# Patient Record
Sex: MALE | Race: BLACK OR AFRICAN AMERICAN | Employment: UNEMPLOYED | URBAN - METROPOLITAN AREA
[De-identification: names, ages, dates, MRNs, and addresses within clinical notes are randomized per-mention and may not be internally consistent; named-entity substitution may affect disease eponyms.]

---

## 2018-05-10 ENCOUNTER — HOSPITAL ENCOUNTER (INPATIENT)
Age: 43
LOS: 6 days | Discharge: HOME | DRG: 750 | End: 2018-05-16
Attending: PSYCHIATRY & NEUROLOGY | Admitting: PSYCHIATRY & NEUROLOGY
Payer: MEDICAID

## 2018-05-10 PROBLEM — F20.9 SCHIZOPHRENIA (HCC): Status: ACTIVE | Noted: 2018-05-10

## 2018-05-10 PROCEDURE — 65220000003 HC RM SEMIPRIVATE PSYCH

## 2018-05-10 PROCEDURE — 74011250637 HC RX REV CODE- 250/637: Performed by: PSYCHIATRY & NEUROLOGY

## 2018-05-10 RX ORDER — ACETAMINOPHEN 325 MG/1
650 TABLET ORAL
Status: DISCONTINUED | OUTPATIENT
Start: 2018-05-10 | End: 2018-05-16 | Stop reason: HOSPADM

## 2018-05-10 RX ORDER — IBUPROFEN 400 MG/1
400 TABLET ORAL
Status: DISCONTINUED | OUTPATIENT
Start: 2018-05-10 | End: 2018-05-16 | Stop reason: HOSPADM

## 2018-05-10 RX ORDER — ZOLPIDEM TARTRATE 10 MG/1
10 TABLET ORAL
Status: DISCONTINUED | OUTPATIENT
Start: 2018-05-10 | End: 2018-05-16 | Stop reason: HOSPADM

## 2018-05-10 RX ORDER — OLANZAPINE 5 MG/1
5 TABLET ORAL
Status: DISCONTINUED | OUTPATIENT
Start: 2018-05-10 | End: 2018-05-16 | Stop reason: HOSPADM

## 2018-05-10 RX ORDER — LORAZEPAM 2 MG/ML
2 INJECTION INTRAMUSCULAR
Status: DISCONTINUED | OUTPATIENT
Start: 2018-05-10 | End: 2018-05-16 | Stop reason: HOSPADM

## 2018-05-10 RX ORDER — BENZTROPINE MESYLATE 1 MG/ML
2 INJECTION INTRAMUSCULAR; INTRAVENOUS
Status: DISCONTINUED | OUTPATIENT
Start: 2018-05-10 | End: 2018-05-16 | Stop reason: HOSPADM

## 2018-05-10 RX ORDER — LORAZEPAM 1 MG/1
1 TABLET ORAL
Status: DISCONTINUED | OUTPATIENT
Start: 2018-05-10 | End: 2018-05-16 | Stop reason: HOSPADM

## 2018-05-10 RX ORDER — BENZTROPINE MESYLATE 2 MG/1
2 TABLET ORAL
Status: DISCONTINUED | OUTPATIENT
Start: 2018-05-10 | End: 2018-05-16 | Stop reason: HOSPADM

## 2018-05-10 RX ORDER — ADHESIVE BANDAGE
30 BANDAGE TOPICAL DAILY PRN
Status: DISCONTINUED | OUTPATIENT
Start: 2018-05-10 | End: 2018-05-16 | Stop reason: HOSPADM

## 2018-05-10 RX ORDER — IBUPROFEN 200 MG
1 TABLET ORAL
Status: DISCONTINUED | OUTPATIENT
Start: 2018-05-10 | End: 2018-05-16 | Stop reason: HOSPADM

## 2018-05-10 RX ADMIN — ACETAMINOPHEN 650 MG: 325 TABLET, FILM COATED ORAL at 18:39

## 2018-05-10 NOTE — IP AVS SNAPSHOT
[address] 
[phone] Patient: Kezia Velázquez MRN: [id number] :1975 A check holden indicates which time of day the medication should be taken. My Medications START taking these medications Instructions Each Dose to Equal  
 Morning Noon Evening Bedtime  
 divalproex  mg ER tablet Commonly known as:  DEPAKOTE ER Your last dose was: Last night Your next dose is:  tonight Take 5 Tabs by mouth nightly. Indications: BIPOLAR DISORDER IN REMISSION  
 1250 mg  
    
   
   
   
  
  
 haloperidol 10 mg tablet Commonly known as:  HALDOL Your last dose was: This morning Your next dose is: This afternoon/evening Take 1 Tab by mouth three (3) times daily. Indications: Schizophrenia 10 mg  
    
  
   
   
  
   
  
  
 hydrOXYzine HCl 50 mg tablet Commonly known as:  ATARAX Your last dose was:  2018 Notes to Patient:  Takes as needed for anxiety Take 1 Tab by mouth three (3) times daily as needed for Itching for up to 10 days. Indications: anxiety 50 mg Where to Get Your Medications Information on where to get these meds will be given to you by the nurse or doctor. ! Ask your nurse or doctor about these medications  
  divalproex  mg ER tablet  
 haloperidol 10 mg tablet  
 hydrOXYzine HCl 50 mg tablet

## 2018-05-10 NOTE — IP AVS SNAPSHOT
2700 [facility] [address] [place] [address] 
[phone] Patient: Teresita Bradford MRN: [id number] :1975 About your hospitalization You were admitted on:  May 10, 2018 You last received care in the:  [facility] You were discharged on:  May 16, 2018 Why you were hospitalized Your primary diagnosis was:  Schizophrenia (Hcc) Follow-up Information Follow up With Details Comments Contact Info [address]  On 2018 walk in for treatment  [address] [facility] [id number] [phone] [facility]  On 2018 Marisol Gillespie will pick you up at your mothers home  39[address] [facility], [address] Hours: Open · Closes 5PM 
Phone: [phone] 
fax [phone] Discharge Orders None A check holden indicates which time of day the medication should be taken. My Medications START taking these medications Instructions Each Dose to Equal  
 Morning Noon Evening Bedtime  
 divalproex  mg ER tablet Commonly known as:  DEPAKOTE ER Your last dose was: Last night Your next dose is:  tonight Take 5 Tabs by mouth nightly. Indications: BIPOLAR DISORDER IN REMISSION  
 1250 mg  
    
   
   
   
  
  
 haloperidol 10 mg tablet Commonly known as:  HALDOL Your last dose was: This morning Your next dose is: This afternoon/evening Take 1 Tab by mouth three (3) times daily. Indications: Schizophrenia 10 mg  
    
  
   
   
  
   
  
  
 hydrOXYzine HCl 50 mg tablet Commonly known as:  ATARAX Your last dose was:  2018 Notes to Patient:  Takes as needed for anxiety Take 1 Tab by mouth three (3) times daily as needed for Itching for up to 10 days. Indications: anxiety 50 mg Where to Get Your Medications Information on where to get these meds will be given to you by the nurse or doctor. ! Ask your nurse or doctor about these medications  
  divalproex  mg ER tablet  
 haloperidol 10 mg tablet  
 hydrOXYzine HCl 50 mg tablet Discharge Instructions DISCHARGE SUMMARY 
 
NAME:Calvin Jean : 1975 MRN: [id number] The patient Nicole Zapien exhibits the ability to control behavior in a less restrictive environment. Patient's level of functioning is improving. No assaultive/destructive behavior has been observed for the past 24 hours. No suicidal/homicidal threat or behavior has been observed for the past 24 hours. There is no evidence of serious medication side effects. Patient has not been in physical or protective restraints for at least the past 24 hours. If weapons involved, how are they secured? No weapons involved Is patient aware of and in agreement with discharge plan? Patient is aware of discharge and is in agreement Arrangements for medication:  Prescriptions called into [facility] . Referral for substance abuse treatment ? Yes - referral to the [facility] Referral for smoking cessation needed ? Yes, refused Copy of discharge instructions to  provider?:  Yes, fax to [address] [place] [address] Drive and the [facility] Arrangements for transportation home:  Taxi Keep all follow up appointments as scheduled, continue to take prescribed medications per physician instructions. Mental health crisis number:  447 or your local mental health crisis line number at [phone] [facility] DISCHARGE SUMMARY from Nurse PATIENT INSTRUCTIONS: 
 
What to do at Home: 
Recommended activity: Activity as tolerated, If you experience any of the following symptoms , thoughts of hurting self or others, hearing or seeing things that ar not there, please follow up with Mental Health Crisis [phone]. *  Please give a list of your current medications to your Primary Care Provider. *  Please update this list whenever your medications are discontinued, doses are 
    changed, or new medications (including over-the-counter products) are added. *  Please carry medication information at all times in case of emergency situations. These are general instructions for a healthy lifestyle: No smoking/ No tobacco products/ Avoid exposure to second hand smoke Surgeon General's Warning:  Quitting smoking now greatly reduces serious risk to your health. Obesity, smoking, and sedentary lifestyle greatly increases your risk for illness A healthy diet, regular physical exercise & weight monitoring are important for maintaining a healthy lifestyle You may be retaining fluid if you have a history of heart failure or if you experience any of the following symptoms:  Weight gain of 3 pounds or more overnight or 5 pounds in a week, increased swelling in our hands or feet or shortness of breath while lying flat in bed. Please call your doctor as soon as you notice any of these symptoms; do not wait until your next office visit. Recognize signs and symptoms of STROKE: 
 
F-face looks uneven A-arms unable to move or move unevenly S-speech slurred or non-existent T-time-call 911 as soon as signs and symptoms begin-DO NOT go Back to bed or wait to see if you get better-TIME IS BRAIN. Warning Signs of HEART ATTACK Call 911 if you have these symptoms: 
? Chest discomfort. Most heart attacks involve discomfort in the center of the chest that lasts more than a few minutes, or that goes away and comes back. It can feel like uncomfortable pressure, squeezing, fullness, or pain. ? Discomfort in other areas of the upper body. Symptoms can include pain or discomfort in one or both arms, the back, neck, jaw, or stomach. ? Shortness of breath with or without chest discomfort. ? Other signs may include breaking out in a cold sweat, nausea, or lightheadedness. Don't wait more than five minutes to call [address]! Fast action can save your life. Calling 911 is almost always the fastest way to get lifesaving treatment. Emergency Medical Services staff can begin treatment when they arrive  up to an hour sooner than if someone gets to the hospital by car. The discharge information has been reviewed with the patient. The patient verbalized understanding. Discharge medications reviewed with the patient and appropriate educational materials and side effects teaching were provided. ___________________________________________________________________________________________________________________________________ [employer] Announcement We are excited to announce that we are making your provider's discharge notes available to you in [employer]. You will see these notes when they are completed and signed by the physician that discharged you from your recent hospital stay. If you have any questions or concerns about any information you see in [employer], please call the Health Information Department where you were seen or reach out to your Primary Care Provider for more information about your plan of care. [facility]! Anastasiya Buchanan introduces [employer] patient portal. Now you can access parts of your medical record, email your doctor's office, and request medication refills online. 1. In your internet browser, go to [email or web address] 2. Click on the First Time User? Click Here link in the Sign In box. You will see the New Member Sign Up page. 3. Enter your [employer] Access Code exactly as it appears below. You will not need to use this code after youve completed the sign-up process. If you do not sign up before the expiration date, you must request a new code. · [employer] Access Code: [email or web address] Expires: 8/14/2018  2:42 PM 
 
4.  Enter the last four digits of your Social Security Number (xxxx) and Date of Birth (mm/dd/yyyy) as indicated and click Submit. You will be taken to the next sign-up page. 5. Create a [employer] ID. This will be your [employer] login ID and cannot be changed, so think of one that is secure and easy to remember. 6. Create a [employer] password. You can change your password at any time. 7. Enter your Password Reset Question and Answer. This can be used at a later time if you forget your password. 8. Enter your e-mail address. You will receive e-mail notification when new information is available in [address] 9. Click Sign Up. You can now view and download portions of your medical record. 10. Click the Download Summary menu link to download a portable copy of your medical information. If you have questions, please visit the Frequently Asked Questions section of the [employer] website. Remember, [employer] is NOT to be used for urgent needs. For medical emergencies, dial 911. Now available from your iPhone and Android! Introducing Ashwin Fernández As a Rudolobdulia Sneedy patient, I wanted to make you aware of our electronic visit tool called Ashwin Mikedereckfin. Faizan Zamudio 24/7 allows you to connect within minutes with a medical provider 24 hours a day, seven days a week via a mobile device or tablet or logging into a secure website from your computer. You can access Ashwin Fernández from anywhere in the [place]. A virtual visit might be right for you when you have a simple condition and feel like you just dont want to get out of bed, or cant get away from work for an appointment, when your regular Emmaolobdulia Sneedy provider is not available (evenings, weekends or holidays), or when youre out of town and need minor care. Electronic visits cost only $49 and if the [employer] 24/7 provider determines a prescription is needed to treat your condition, one can be electronically transmitted to a nearby pharmacy*. Please take a moment to enroll today if you have not already done so. The enrollment process is free and takes just a few minutes. To enroll, please download the [facility] 24/7 mariano to your tablet or phone, or visit [email or web address] to enroll on your computer. And, as an [address] [place] [address] patient with a [employer] account, the results of your visits will be scanned into your electronic medical record and your primary care provider will be able to view the scanned results. We urge you to continue to see your regular [facility] provider for your ongoing medical care. And while your primary care provider may not be the one available when you seek a [employer] virtual visit, the peace of mind you get from getting a real diagnosis real time can be priceless. For more information on [employer], view our Frequently Asked Questions (FAQs) at [email or web address]. Sincerely, 
 
Uzma Magana MD 
Chief Medical Officer [address] [place] [address] *:  certain medications cannot be prescribed via [employer] Providers Seen During Your Hospitalization Provider Specialty Primary office phone Carina Moore MD Psychiatry [phone] Shelby Childers MD Psychiatry [phone] Your Primary Care Physician (PCP) ** None ** You are allergic to the following No active allergies Recent Documentation Height Weight BMI Smoking Status 1.88 m 78.9 kg 22.34 kg/m2 Current Every Day Smoker Emergency Contacts Name Discharge Info Relation Home Work Mobile [facility] DISCHARGE CAREGIVER [3] Parent [1] [phone] Mari Bhatti N/A  AT THIS TIME [6] Unknown [9]   [phone] Patient Belongings  The following personal items are in your possession at time of discharge: 
  Dental Appliances: None  Visual Aid: None      Home Medications: Locked Jewelry: None  Clothing: Belt, Pants, Shirt (locked up)    Other Valuables:  (locked in med cabinet )  Personal Items Sent to Safe:  (none) Please provide this summary of care documentation to your next provider. Signatures-by signing, you are acknowledging that this After Visit Summary has been reviewed with you and you have received a copy. Patient Signature:  ____________________________________________________________ Date:  ____________________________________________________________  
  
Jeremiah Marengo Provider Signature:  ____________________________________________________________ Date:  ____________________________________________________________

## 2018-05-10 NOTE — BH NOTES
Primary Nurse Sherrell Parish, RN and Krissy Manzano, RN performed a dual skin assessment on this patient Impairment noted- see wound doc flow sheet  Carlos score is 23    Old skin graft to right anterior thigh from [facility]; skin intact    Patient admitted to unit at 1725; VSS at time of admission; patient denies current SI/HI; triage hospitalist paged for H/P at [address] [facility] [address] Nita Laws; patient endorses auditory hallucinations from time to time and states, \"He is feeling very down;\" but denies visual hallucinations; patient oriented to unit and unit norms

## 2018-05-11 PROCEDURE — 74011250637 HC RX REV CODE- 250/637: Performed by: PSYCHIATRY & NEUROLOGY

## 2018-05-11 PROCEDURE — 65220000003 HC RM SEMIPRIVATE PSYCH

## 2018-05-11 RX ORDER — DIVALPROEX SODIUM 500 MG/1
1000 TABLET, EXTENDED RELEASE ORAL
Status: DISCONTINUED | OUTPATIENT
Start: 2018-05-11 | End: 2018-05-14

## 2018-05-11 RX ORDER — HALOPERIDOL 2 MG/ML
10 SOLUTION ORAL 2 TIMES DAILY
Status: DISCONTINUED | OUTPATIENT
Start: 2018-05-11 | End: 2018-05-14

## 2018-05-11 RX ADMIN — DIVALPROEX SODIUM 1000 MG: 500 TABLET, FILM COATED, EXTENDED RELEASE ORAL at 21:36

## 2018-05-11 RX ADMIN — ACETAMINOPHEN 650 MG: 325 TABLET, FILM COATED ORAL at 12:39

## 2018-05-11 RX ADMIN — HALOPERIDOL 10 MG: 2 SOLUTION ORAL at 21:36
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
## 2018-05-11 NOTE — BH NOTES
GROUP THERAPY PROGRESS NOTE    Moiz Whitlock did not participate in a 60 minute Acute Unit's Process Group, with a focus identifying feelings, planning for the rest of the day, and discussing discharge.

## 2018-05-11 NOTE — PROGRESS NOTES
Problem: Falls - Risk of  Goal: *Absence of Falls  Document Fredy Fall Risk and appropriate interventions in the flowsheet. Outcome: Progressing Towards Goal  Fall Risk Interventions:            Medication Interventions: Teach patient to arise slowly       Resting in bed with eyes closed, no complaints, no distress noted. Safety measures in place, will continue to monitor. 0600-Refused am labs.
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
## 2018-05-11 NOTE — PROGRESS NOTES
NUTRITION       Nutrition screening referral was triggered based on results obtained during nursing admission assessment. The patient's chart was reviewed and nutrition assessment is not indicated at this time (see weight trends below). Plan to see patient for rescreen as indicated. Thank you.      Wt Readings from Last 8 Encounters:   05/10/18 78.9 kg (174 lb)   07/07/16 77.1 kg (170 lb)   06/19/15 77.1 kg (170 lb)   09/11/14 77.1 kg (170 lb)   12/11/13 77.1 kg (170 lb)   05/17/12 74.8 kg (165 lb)   03/25/11 72.6 kg (160 lb)   07/22/10 72.6 kg (160 lb)       Kerri Hayes RD [facility]

## 2018-05-11 NOTE — INTERDISCIPLINARY ROUNDS
Behavioral Health Interdisciplinary Rounds     Patient Name: Rupa Palmer  Age: 40 y.o. Room/Bed:  734/  Primary Diagnosis: <principal problem not specified>   Admission Status: IC      Readmission within 30 days: no  Power of  in place: no  Patient requires a blocked bed: no          Reason for blocked bed:     VTE Prophylaxis: Not indicated    Mobility needs/Fall risk: no    Nutritional Plan: no  Consults:          Labs/Testing due today?: yes-Refused    Sleep hours:  6.5      Participation in Care/Groups:no  Medication Compliant?: No scheduled meds  PRNS (last 24 hours): Pain    Restraints (last 24 hours):  no     CIWA (range last 24 hours): CIWA-Ar Total: 6   COWS (range last 24 hours): COWS Total: 7    Alcohol screening (AUDIT) completed -   AUDIT Score: 4     If applicable, date SBIRT discussed in treatment team AND documented:     Tobacco - patient is a smoker: Have You Used Tobacco in the Past 30 Days: Yes  Illegal Drugs use: Have You Used Any Illegal Substances Over the Past 12 Months: Yes    24 hour chart check complete: yes     Patient goal(s) for today: Meet with Tx team and attend TDO hearing  Treatment team focus/goals: Complete Psych-Social Assessment  Progress note: Cooperative, mildly pleasant but extremely delusional and paranoid    LOS:  1  Expected LOS:     Financial concerns/prescription coverage:  Medicaid / TDO  Date of last family contact:      Family requesting physician contact today: no   Discharge plan: Return to his home   Guns in the home: n/a        [facility]  Supportive Housing [facility] (?)     Participating treatment team members: Dr. Reji Oliveros and Alecia Craig RN

## 2018-05-11 NOTE — PROGRESS NOTES
Problem: Altered Thought Process (Adult/Pediatric)  Goal: *STG: Remains safe in hospital  Outcome: Progressing Towards Goal  Safety checks [id number]; environmental rounds to ensure safety; patient encouraged to not isolate himself in room; patient continues to be paranoid and quite disorganized in his thought process; patient preoccupied with number and [place] ; denies [address]; patient asked this writer about getting methadone due to his stated heroin use (urine drug screen was negative for opiates)--I redirected him to talk with treatment team tomorrow about this

## 2018-05-11 NOTE — PROGRESS NOTES
Problem: Altered Thought Process (Adult/Pediatric) Meet by 5/18/18  Goal: *STG: Participates in treatment plan  Outcome: Progressing Towards Goal  Pt alert paranoid delusions, poor insight, impaired judgement. Pt preoccupied. Refuses lab work and medications. Pt waiting for Foster LISA hearing today. Pt stated goal discharge today. Goal: *STG: Remains safe in hospital  Outcome: Progressing Towards Goal  Pt remains safe in hospital on q 15 min safety checks. Goal: *STG: Attends activities and groups  Outcome: Not Progressing Towards Goal  Pt encouraged to attend groups and activities. [facility] 60  Master Treatment Plan for Gricel Number    Date Treatment Plan Initiated: 5/11/18    Treatment Plan Modalities:  Type of Modality Amount  (x minutes) Frequency (x/week) Duration (x days) Name of Responsible Staff   710 [facility] meetings to encourage peer interactions 120 [facility]   Group psychotherapy to assist in building coping skills and internal controls 60 7 1 Yamil Ludwig   Therapeutic activity groups to build coping skills 60 7 1 Yamil Ludwig   Psychoeducation in group setting to address:   Medication education   15 7 1 Trish Mejía RN   Coping skills         Relaxation techniques         Symptom management         Discharge planning   60 2 [address] 115   60 1 1 volunteer   Recovery/AA/NA      volunteer   Physician medication management   15 7 1 Dr. Aman Adame meeting/discharge planning   15 2 1400 [facility] Al[facility]es                                  200- pt verbalizes concerns that Teena Nuñez are looking for me. They are coming top get me\" paranoid guarded. Pt refuses scheduled Haldol stating he has taken this medication PTA and experience extreme side effects. Pt refuses prn medications to include ativan, Zyprexa. Pt asking for methadone    .    PRN Medication Documentation    Specific patient behavior that led to need for PRN medication: pt c/o generalized pain chronic   Staff interventions attempted prior to PRN being given: education, therapeutic communication   PRN medication given: po 650 mg Tylenol, nicotine patch 21 mg transdermal   Patient response/effectiveness of PRN medication: pt visible on unit.       1500-  Involuntary Committed: Yeni
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
## 2018-05-11 NOTE — BH NOTES
PSYCHOSOCIAL ASSESSMENT  :Patient identifying info:  Kaylene Bailey is a 40 y.o., male admitted 5/10/2018  5:25 PM     Presenting problem and precipitating factors:  Pt admitted under TDO due to extreme hill and delusional thinking. Mental status assessment: Alert, manic and delusional     [facility] MH Team     Previous psychiatric services/providers and response to treatment:  Yes, numerous , pt is well known to [facility]    Family history of mental illness : Yes, but  unsure which family member    Substance abuse history:  Pt has a history of poly-substance : THC and alcohol   Social History   Substance Use Topics    Smoking status: Current Every Day Smoker     Packs/day: 1.00     Years: 10.00    Smokeless tobacco: Never Used    Alcohol use No       Family constellation:  Mother and siblings (?)     Is significant other involved? N/A    Describe support system: Pt.'s mental health support team ( Lana Sifuentes) provides his greatest source of support. Describe living arrangements and home environment: Pt is single, has no children and he lives alone in his apartment.      Health issues: Review H&P    Hospital Problems  Never Reviewed          Codes Class Noted POA    * (Principal)Schizophrenia [facility]) ICD-10-CM: F20.9  ICD-9-CM: 295.90  5/10/2018 Unknown              Trauma history:  Unk    Legal issues:  Unk    History of  service: N/A    Financial status: Pt receives social security    Pentecostal/cultural factors:  Not voiced    Education/work history:  HS and currently unemployed    Have you been licensed as a aruna care professional (current or ):  No    Leisure and recreation preferences:  Watching TV or  playing video games    Describe coping skills:  Ineffective and poor judgement      Leroy Branham  2018

## 2018-05-11 NOTE — PROGRESS NOTES
Problem: Depressed Mood (Adult/Pediatric)  Goal: *STG: Participates in treatment plan  Outcome: Progressing Towards Goal  Patient encouraged to not be isolative in the room; patient encouraged to attend groups; patient out in dining room eating dinner; [id number] safety checks; patient oriented to room and unit procedures

## 2018-05-12 PROCEDURE — 65220000003 HC RM SEMIPRIVATE PSYCH

## 2018-05-12 PROCEDURE — 74011250637 HC RX REV CODE- 250/637: Performed by: PSYCHIATRY & NEUROLOGY

## 2018-05-12 PROCEDURE — 74011250637 HC RX REV CODE- 250/637

## 2018-05-12 RX ORDER — HYDROXYZINE 50 MG/1
50 TABLET, FILM COATED ORAL
Status: DISCONTINUED | OUTPATIENT
Start: 2018-05-12 | End: 2018-05-16 | Stop reason: HOSPADM

## 2018-05-12 RX ADMIN — HALOPERIDOL 10 MG: 2 SOLUTION ORAL at 09:18

## 2018-05-12 RX ADMIN — LORAZEPAM 1 MG: 1 TABLET ORAL at 16:41

## 2018-05-12 RX ADMIN — HYDROXYZINE HYDROCHLORIDE 50 MG: 50 TABLET, FILM COATED ORAL at 10:28

## 2018-05-12 RX ADMIN — HALOPERIDOL 10 MG: 2 SOLUTION ORAL at 21:13

## 2018-05-12 RX ADMIN — DIVALPROEX SODIUM 1000 MG: 500 TABLET, FILM COATED, EXTENDED RELEASE ORAL at 21:13

## 2018-05-12 NOTE — BH NOTES
GROUP THERAPY PROGRESS NOTE    The patient Teresita Bradford is participating in [employer]. Group time: 30 minutes    Personal goal for participation: to orient the patient to the unit.     Goal orientation: successful adoption of unit rules    Group therapy participation: active    Therapeutic interventions reviewed and discussed: Yes    Impression of participation:     Fredy Dennis  5/12/2018 10:22 AM

## 2018-05-12 NOTE — BH NOTES
PRN Medication Documentation    Specific patient behavior that led to need for PRN medication: anxious  Staff interventions attempted prior to PRN being given: coping skillsPRN medication given: ativan  Patient response/effectiveness of PRN medication: good,tl aware

## 2018-05-12 NOTE — PROGRESS NOTES
Problem: Altered Thought Process (Adult/Pediatric)  Goal: *STG: Remains safe in hospital  Outcome: Progressing Towards Goal  Pt remains safe in hospital on q 15 min safety checks. Goal: *STG: Attends activities and groups  Outcome: Progressing Towards Goal  Pt encouraged to attend groups and activities. Goal: *STG: Decreased delusional thinking  Outcome: Not Progressing Towards Goal  Pt is paranoid delusional. Poor insight, impaired judgement. Guarded distracted. Labile. Compliant with medication.  Pt preference: cranberry juice with liquid Haldol    1030- PRN Medication Documentation    Specific patient behavior that led to need for PRN medication: pt anxious, c/o generalized discomfort, labile    Staff interventions attempted prior to PRN being given: education, shower, therapeutic communication   PRN medication given: po 50 mg Atarax   Patient response/effectiveness of PRN medication: pt visible on unit

## 2018-05-12 NOTE — BH NOTES
GROUP THERAPY PROGRESS NOTE    The patient Linda Miramontes is participating in [employer]. Group time: 30 minutes    Personal goal for participation: to orient the patient to the unit.     Goal orientation: successful adoption of unit rules    Group therapy participation: active    Therapeutic interventions reviewed and discussed: Yes    Impression of participation:     Finn Das  5/12/2018 10:21 AM

## 2018-05-12 NOTE — BH NOTES
Behavioral Health Interdisciplinary Rounds     Patient Name: Charmayne Seal  Age: 40 y.o.   Room/Bed:  734/  Primary Diagnosis: Schizophrenia ([facility] [address]   Admission Status: Involuntary Commitment     Readmission within 30 days: no  Power of  in place: no  Patient requires a blocked bed: no          Reason for blocked bed:     VTE Prophylaxis: Not indicated    Mobility needs/Fall risk: no    Nutritional Plan: no  Consults:          Labs/Testing due today?: yes    Sleep hours: 8       Participation in Care/Groups:  no  Medication Compliant?: Selective  PRNS (last 24 hours): Pain Medication and nicotine patch    Restraints (last 24 hours):  no     CIWA (range last 24 hours): CIWA-Ar Total: 6   COWS (range last 24 hours): COWS Total: 7    Alcohol screening (AUDIT) completed -   AUDIT Score: 4     If applicable, date SBIRT discussed in treatment team AND documented:     Tobacco - patient is a smoker: Have You Used Tobacco in the Past 30 Days: Yes  Illegal Drugs use: Have You Used Any Illegal Substances Over the Past 12 Months: Yes    24 hour chart check complete: yes     Patient goal(s) for today:   Treatment team focus/goals:   Progress note: Disorganized, paranoid, feeling unsafe contracts for safety     LOS:  2  Expected LOS:     Financial concerns/prescription coverage:    Date of last family contact:       Family requesting physician contact today:    Discharge plan:   Guns in the home:         Outpatient provider(s): Lana Sifuentes    Participating treatment team members: Charmayne Seal, N Manning Pastures RN and Dr Luly Blair
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
## 2018-05-12 NOTE — PROGRESS NOTES
Problem: Depressed Mood (Adult/Pediatric)  Goal: *STG: Remains safe in hospital  Outcome: Progressing Towards Goal  Remains safe in hospital. Patient asleep in bed at this time. Respirations regular and even. Continue to monitor q 15 minutes for safety. 0550-patient up in luong.  Refused to allow lab work to be drawn this am.

## 2018-05-12 NOTE — PROGRESS NOTES
Problem: Altered Thought Process (Adult/Pediatric)  Goal: *STG: Remains safe in hospital  Outcome: Progressing Towards Goal  Pt out in out in milieu paranoid and suspicious of others. Guarded at times. Appetite is good and pt complies with scheduled medications. Staff will continue to monitor q 15 min checks.

## 2018-05-13 PROCEDURE — 65220000003 HC RM SEMIPRIVATE PSYCH

## 2018-05-13 PROCEDURE — 74011250637 HC RX REV CODE- 250/637: Performed by: PSYCHIATRY & NEUROLOGY

## 2018-05-13 PROCEDURE — 36415 COLL VENOUS BLD VENIPUNCTURE: CPT

## 2018-05-13 PROCEDURE — 87205 SMEAR GRAM STAIN: CPT

## 2018-05-13 PROCEDURE — 87186 SC STD MICRODIL/AGAR DIL: CPT

## 2018-05-13 PROCEDURE — 87077 CULTURE AEROBIC IDENTIFY: CPT

## 2018-05-13 RX ADMIN — HALOPERIDOL 10 MG: 2 SOLUTION ORAL at 09:11

## 2018-05-13 RX ADMIN — DIVALPROEX SODIUM 1000 MG: 500 TABLET, FILM COATED, EXTENDED RELEASE ORAL at 21:21

## 2018-05-13 RX ADMIN — HALOPERIDOL 10 MG: 2 SOLUTION ORAL at 21:21

## 2018-05-13 RX ADMIN — ZOLPIDEM TARTRATE 10 MG: 10 TABLET ORAL at 21:22

## 2018-05-13 NOTE — BH NOTES
Behavioral Health Interdisciplinary Rounds     Patient Name: Lesly Wilburn  Age: 40 y.o. Room/Bed:  4/  Primary Diagnosis: Schizophrenia ([facility] [address]   Admission Status: Involuntary Commitment     Readmission within 30 days: no  Power of  in place: no  Patient requires a blocked bed: no          Reason for blocked bed: n/a    VTE Prophylaxis: Not indicated    Mobility needs/Fall risk: no    Nutritional Plan: no  Consults:          Labs/Testing due today?: no    Sleep hours:  12.0      Participation in Care/Groups:  selective  Medication Compliant?: Yes  PRNS (last 24 hours):  Antianxiety and atarax    Restraints (last 24 hours):  no     CIWA (range last 24 hours): CIWA-Ar Total: 6   COWS (range last 24 hours): COWS Total: 7    Alcohol screening (AUDIT) completed -   AUDIT Score: 4     If applicable, date SBIRT discussed in treatment team AND documented:     Tobacco - patient is a smoker: Have You Used Tobacco in the Past 30 Days: Yes  Illegal Drugs use: Have You Used Any Illegal Substances Over the Past 12 Months: Yes    24 hour chart check complete: yes     Patient goal(s) for today:   Treatment team focus/goals:   Progress note     LOS:  3  Expected LOS:     Financial concerns/prescription coverage:    Date of last family contact:       Family requesting physician contact today:    Discharge plan:   Guns in the home:         Outpatient provider(s):     Participating treatment team members: Lesly Wilburn, * (assigned SW),

## 2018-05-13 NOTE — BH NOTES
GROUP THERAPY PROGRESS NOTE    Mata Chaudhry is participating in [place]. Group time: 15 minutes    Personal goal for participation: No goal    Goal orientation: personal    Group therapy participation: left early. Therapeutic interventions reviewed and discussed: Writer listened attentively and explained unit routine. Impression of participation: left early.

## 2018-05-13 NOTE — BH NOTES
Behavioral Health Interdisciplinary Rounds     Patient Name: Mauri Dumont  Age: 40 y.o. Room/Bed:  4/  [facility]   Admission Status: Involuntary Commitment     Readmission within 30 days: no  Power of  in place: no  Patient requires a blocked bed: no          Reason for blocked bed: n/a    VTE Prophylaxis: Not indicated    Mobility needs/Fall risk: no    Nutritional Plan: no  Consults:          Labs/Testing due today?: no    Sleep hours:        Participation in Care/Groups:  selective  Medication Compliant?: Yes  PRNS (last 24 hours):  Antianxiety and atarax    Restraints (last 24 hours):  no     CIWA (range last 24 hours): CIWA-Ar Total: 6   COWS (range last 24 hours): COWS Total: 7    Alcohol screening (AUDIT) completed -   AUDIT Score: 4     If applicable, date SBIRT discussed in treatment team AND documented:     Tobacco - patient is a smoker: Have You Used Tobacco in the Past 30 Days: Yes  Illegal Drugs use: Have You Used Any Illegal Substances Over the Past 12 Months: Yes    24 hour chart check complete: yes     Patient goal(s) for today:   Treatment team focus/goals:   Progress note     LOS:  3  Expected LOS:     Financial concerns/prescription coverage:    Date of last family contact:       Family requesting physician contact today:    Discharge plan:   Guns in the home:         Outpatient provider(s):     Participating treatment team members: Mauri Dumont, * (assigned SW),

## 2018-05-13 NOTE — PROGRESS NOTES
Problem: Altered Thought Process (Adult/Pediatric)  Goal: *STG: Decreased delusional thinking  Outcome: Progressing Towards Goal  Progressing slowly. Paranoid delusional. Suspicious. Guarded. Distracted. Medication compliant with encouragement. Periodically refusing VS. Refuses lab work. Isolates in room majority of shift. Refuses shower. Meal compliant.
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
## 2018-05-13 NOTE — PROGRESS NOTES
Problem: Altered Thought Process (Adult/Pediatric)  Goal: *STG: Remains safe in hospital  Outcome: Progressing Towards Goal  Patient asleep in bed at this time. Respirations regular and even. Continue to monitor q 15 minutes for safety. 0615-patient asked if he would allow his lab work to be drawn today and he again refused. Patient has had labs to be drawn for a few days and patient continues to refuse.

## 2018-05-13 NOTE — PROGRESS NOTES
Pt refuses consent for HIV testing. Pt refuses to provide a urine specimen. Message let for WOCN. Call placed to [facility]geons group. Dr. Blanka Forrest notified of consult. Clive Forrest in to see pt.

## 2018-05-13 NOTE — PROGRESS NOTES
Problem: Altered Thought Process (Adult/Pediatric)  Goal: *STG: Complies with medication therapy  Outcome: Progressing Towards Goal  Pt is medication and meal compliant. Pt refuses lab work. Goal: *STG: Attends activities and groups  Outcome: Progressing Towards Goal  Pt encourage to attend groups and activities. Goal: *STG: Decreased delusional thinking  Outcome: Progressing Towards Goal  . Progressing slowly. Pt is paranoid and delusional. Distracted.

## 2018-05-14 PROCEDURE — 65220000003 HC RM SEMIPRIVATE PSYCH

## 2018-05-14 PROCEDURE — 74011250637 HC RX REV CODE- 250/637: Performed by: PSYCHIATRY & NEUROLOGY

## 2018-05-14 PROCEDURE — 77030011256 HC DRSG MEPILEX <16IN NO BORD MOLN -A

## 2018-05-14 RX ORDER — HALOPERIDOL 2 MG/ML
10 SOLUTION ORAL 3 TIMES DAILY
Status: DISCONTINUED | OUTPATIENT
Start: 2018-05-14 | End: 2018-05-16 | Stop reason: HOSPADM

## 2018-05-14 RX ADMIN — DIVALPROEX SODIUM 1250 MG: 250 TABLET, FILM COATED, EXTENDED RELEASE ORAL at 21:07

## 2018-05-14 RX ADMIN — HALOPERIDOL 10 MG: 2 SOLUTION ORAL at 16:39

## 2018-05-14 RX ADMIN — HALOPERIDOL 10 MG: 2 SOLUTION ORAL at 21:07

## 2018-05-14 RX ADMIN — IBUPROFEN 400 MG: 400 TABLET ORAL at 16:44

## 2018-05-14 RX ADMIN — HALOPERIDOL 10 MG: 2 SOLUTION ORAL at 08:04

## 2018-05-14 NOTE — PROGRESS NOTES
Problem: Altered Thought Process (Adult/Pediatric)  Goal: *STG: Participates in treatment plan  Outcome: Progressing Towards Goal  Patient denies SI. Med and meal compliant. Patient goal: none stated Staff goal: to teach coping skills  Goal: Interventions  Outcome: Progressing Towards Goal  Medication management, Q 15 min safety rounds.  Group therapy
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
## 2018-05-14 NOTE — BH NOTES
GROUP THERAPY PROGRESS NOTE    The patient Lexi landeros 40 y.o. male is participating in Focus Group.     Group time: 30 minutes    Personal goal for participation: Daily goal setting    Goal orientation: personal    Group therapy participation: active    Therapeutic interventions reviewed and discussed:  Yes    Impression of participation:     Frank Ramirez  5/13/2018  9:44 PM

## 2018-05-14 NOTE — PROGRESS NOTES
Problem: Falls - Risk of  Goal: *Absence of Falls  Document Fredy Fall Risk and appropriate interventions in the flowsheet. Outcome: Progressing Towards Goal  Fall Risk Interventions: In bed asleep with respirations noted as even and unlabored as chest was rising and falling. Will continue to monitor throughout shift form safety and 15 minute checks.          Medication Interventions: Teach patient to arise slowly

## 2018-05-14 NOTE — PROGRESS NOTES
Problem: Altered Thought Process (Adult/Pediatric)  Goal: *STG: Remains safe in hospital  Outcome: Progressing Towards Goal  Patient is lying quietly in bed. Appears to be asleep. No respiratory distress noted.   Patient remains safe in hospital.

## 2018-05-14 NOTE — BH NOTES
GROUP THERAPY PROGRESS NOTE    Loni Dietz participated in the Acute Unit's afternoon Process Group for yesterday, with a focus on identifying feelings, planning for the rest of the day, and preparing for discharge. Group time: 50 minutes. Personal goal for participation: To increase the capacity to improve ones mood and structure. Goal orientation: The patient will be able to identify their feelings, develop a plan for structuring their day, and discharge planning. Group therapy participation: With prompting, this patient participated in the group. Therapeutic interventions reviewed and discussed: The group members were asked to introduce themselves to each other and to see if they could identify an emotion they are having and/or let the group know what they want to focus on for the day as they continue to make discharge plans. Impression of participation: The patient said he had poor sleep and that the last hospitalization prior to this one was eight months ago. He implied that the [facility] keeps picking him up and bringing him to the hospital, with the assistance of \"the . \" He also reported a low appetite which he attributed to an up-coming blood test [?]. He calmly suggested that he was \"ready to fight\" his need to be in the hospital \"up to 30 days, so the hospital could get it's money. \" He was not agitated, he did not rise out of his seat, and his face was somewhat determined but not threatening in any imminent way. The patient expressed no current SI/HI, but did express a desire \"to fight\" in a very non-specific way. He was alert and may have been responding to internal stimuli. His thinking suggested that he was having paranoia with ideas of reference. His attitude towards food might also be part of a psychotic obsession. His affect was slightly anxious but not agitated, with more of a sense of discouragement than hostility. His mood matched his affect.  He was able to sit through this entire group that was also the patient's first process group with the undersigned.
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
## 2018-05-14 NOTE — INTERDISCIPLINARY ROUNDS
Behavioral Health Interdisciplinary Rounds     Patient Name: Lesly Wilburn  Age: 40 y.o.   Room/Bed:  [facility]/  Primary Diagnosis: Schizophrenia ([facility] [address]   Admission Status: Involuntary Commitment     Readmission within 30 days: no  Power of  in place: no  Patient requires a blocked bed: no          Reason for blocked bed:     VTE Prophylaxis: Not indicated    Mobility needs/Fall risk: no    Nutritional Plan: no  Consults:            Labs/Testing due today?: no    Sleep hours:        Participation in Care/Groups:  selective  Medication Compliant?: Yes  PRNS (last 24 hours): Sleep Aid    Restraints (last 24 hours):  no     CIWA (range last 24 hours): CIWA-Ar Total: 6   COWS (range last 24 hours): COWS Total: 7    Alcohol screening (AUDIT) completed -   AUDIT Score: 4     If applicable, date SBIRT discussed in treatment team AND documented:     Tobacco - patient is a smoker: Have You Used Tobacco in the Past 30 Days: Yes  Illegal Drugs use: Have You Used Any Illegal Substances Over the Past 12 Months: Yes    24 hour chart check complete: yes     Patient goal(s) for today:   Treatment team focus/goals:   Progress note    LOS:  4  Expected LOS:     Financial concerns/prescription coverage:    Date of last family contact:      Family requesting physician contact today:    Discharge plan:   Guns in the home:         Outpatient provider(s):     Participating treatment team members: Lesly Wilburn, * (assigned SW),

## 2018-05-14 NOTE — PROGRESS NOTES
Problem: Altered Thought Process (Adult/Pediatric)  Goal: *STG: Participates in treatment plan  Outcome: Progressing Towards Goal  Patient denies SI. Med and meal compliant. Patient goal: none stated Staff goal: to teach coping skills  Goal: Interventions  Outcome: Progressing Towards Goal  Medication management, Q 15 min safety rounds. Group therapy    Patient paranoid. Patient preoccupied with fasting for a lab that he previously refused.  RN instructed that patient did not need to be NPO for this lab

## 2018-05-14 NOTE — PROGRESS NOTES
Admission Medication Reconciliation:    Information obtained from:  RxQuery (lack of insurance data), review of [facility], and electronic medical record. Comments/Recommendations: Updated PTA meds/reviewed patient's allergies. 1)  Unclear when the patient was last on psychiatric medications (no RxQuery data). Per nursing note, the patient not able to give good medication history. - Removed: haloperidol, VPA, and diphenhydramine (from 2016)    2)  The [facility] () was assessed to determine fill        history of any controlled medications. The patient has not filled any controlled substances in the last 12 months. 3)  Patient has been hospitalized at [facility] many times:      - 1/19/2010-1/29/2010 [facility]): discharged on risperidone Consta 25mg IM every 2          wks + olanzapine 30mg QHS      - 7/22/2010-8/9/2010 [facility]): risperidone Consta 37.5mg IM every 2 wks +          olanzapine 15mg BID      - 3/25/2011-3/28/2011 [facility]): olanzapine 10mg QHS + tramadol 50mg q6h PRN      - 5/17/2012-5/23/2012 [facility]): risperidone 6mg QHS      - 12/11/2013-12/20/2013 [facility]): risperidone 6mg QHS + clonazepam 1mg BID      - 9/11/2014-9/15/2014 [facility]): haloperidol 10mg BID + diphenhydramine 50mg BID      - 6/19/2015-6/26/2015 [facility]): haloperidol 20mg QHS + VPA 1500mg QHS +           diphenhydramine 50mg BID      - 7/6/2016-7/8/2016 [facility]): haloperidol 10mg QHS + VPA 1500mg QHS +           diphenhydramine 50mg BID     Significant PMH/Disease States:   Past Medical History:   Diagnosis Date    Aggressive outburst     Marijuana abuse 7/22/2010    Psychiatric disorder     schizophrenia    Psychotic disorder      Chief Complaint for this Admission:  No chief complaint on file. Allergies:  Review of patient's allergies indicates no known allergies.     Prior to Admission Medications:   None     Abel Chamberlain, PharmD, BCPP, [facility], [facility]

## 2018-05-14 NOTE — WOUND CARE
WOCN Note:     New consult placed by RN for assessment of right hip. Patient assessed with RN. Chart reviewed. Past Medical History:  Patient reports that he had hip surgery in the past.    Assessment:   Patient is Alert, conversant, continent and mobile. Bed:  Psych bed  Patient reports no pain. 1. Right hip, partial thickness wound POA:  0.2 x 0.4 x 0.1 cm; 100%  pink. no odor or exudate. Periwound without erythema. Recommendations:    1. Right hip:  Every other day cleanse with saline or mild soap and water; cover with Mepitel border. Skin Care & Pressure Prevention:  Minimize layers of linen/pads under patient to optimize support surface. Turn/reposition approximately every 2 hours and offload heels. Discussed above plan with patient and RN.     Transition of Care: Plan to follow weekly and as needed while admitted to hospital.    CHET BourneN RN [id number] [facility] [phone]  Pager [phone]
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
## 2018-05-14 NOTE — INTERDISCIPLINARY ROUNDS
Behavioral Health Interdisciplinary Rounds     Patient Name: Mitra Junior  Age: 40 y.o. Room/Bed:  734/  Primary Diagnosis: Schizophrenia ([facility] [address]   Admission Status: Involuntary Commitment     Readmission within 30 days: no  Power of  in place: no  Patient requires a blocked bed: no          Reason for blocked bed:     VTE Prophylaxis: Not indicated    Mobility needs/Fall risk: no    Nutritional Plan: no  Consults:            Labs/Testing due today?: no    Sleep hours:  7.15      Participation in Care/Groups:  selective  Medication Compliant?: Yes  PRNS (last 24 hours): Sleep Aid    Restraints (last 24 hours):  no     CIWA (range last 24 hours): CIWA-Ar Total: 6   COWS (range last 24 hours): COWS Total: 7    Alcohol screening (AUDIT) completed -   AUDIT Score: 4     If applicable, date SBIRT discussed in treatment team AND documented:     Tobacco - patient is a smoker: Have You Used Tobacco in the Past 30 Days: Yes  Illegal Drugs use: Have You Used Any Illegal Substances Over the Past 12 Months: Yes    24 hour chart check complete: yes     Patient goal(s) for today:   Treatment team focus/goals: he is refusing an antipsychotic medication. Consider a forced medication order. Progress note- He remains psychotic and bizarre . LOS:  4  Expected LOS: TBD     Financial concerns/prescription coverage:  Medicaid   Date of last family contact:      Family requesting physician contact today:    Discharge plan: he will return to [address] [place] [address] in the home:  no       Outpatient provider(s): [address] [place] [address]     Participating treatment team members: Radha Art RN - Claudeen Lute, PharmD.

## 2018-05-15 LAB
BACTERIA SPEC CULT: ABNORMAL
CHOLEST SERPL-MCNC: 147 MG/DL
EST. AVERAGE GLUCOSE BLD GHB EST-MCNC: 105 MG/DL
GRAM STN SPEC: ABNORMAL
GRAM STN SPEC: ABNORMAL
HBA1C MFR BLD: 5.3 % (ref 4.2–6.3)
HDLC SERPL-MCNC: 47 MG/DL
HDLC SERPL: 3.1 {RATIO} (ref 0–5)
LDLC SERPL CALC-MCNC: 90 MG/DL (ref 0–100)
LIPID PROFILE,FLP: NORMAL
SERVICE CMNT-IMP: ABNORMAL
TRIGL SERPL-MCNC: 50 MG/DL (ref ?–150)
VLDLC SERPL CALC-MCNC: 10 MG/DL

## 2018-05-15 PROCEDURE — 65220000003 HC RM SEMIPRIVATE PSYCH

## 2018-05-15 PROCEDURE — 83036 HEMOGLOBIN GLYCOSYLATED A1C: CPT | Performed by: PSYCHIATRY & NEUROLOGY

## 2018-05-15 PROCEDURE — 80061 LIPID PANEL: CPT | Performed by: PSYCHIATRY & NEUROLOGY

## 2018-05-15 PROCEDURE — 74011250637 HC RX REV CODE- 250/637: Performed by: PSYCHIATRY & NEUROLOGY

## 2018-05-15 PROCEDURE — 36415 COLL VENOUS BLD VENIPUNCTURE: CPT | Performed by: PSYCHIATRY & NEUROLOGY

## 2018-05-15 RX ADMIN — HALOPERIDOL 10 MG: 2 SOLUTION ORAL at 08:33

## 2018-05-15 RX ADMIN — DIVALPROEX SODIUM 1250 MG: 250 TABLET, FILM COATED, EXTENDED RELEASE ORAL at 20:51

## 2018-05-15 RX ADMIN — HALOPERIDOL 10 MG: 2 SOLUTION ORAL at 20:51

## 2018-05-15 RX ADMIN — ZOLPIDEM TARTRATE 10 MG: 10 TABLET ORAL at 21:24

## 2018-05-15 RX ADMIN — HALOPERIDOL 10 MG: 2 SOLUTION ORAL at 17:12

## 2018-05-15 NOTE — PROGRESS NOTES
Problem: Falls - Risk of  Goal: *Absence of Falls  Document Fredy Fall Risk and appropriate interventions in the flowsheet. Outcome: Progressing Towards Goal  Fall Risk Interventions:            Medication Interventions: Teach patient to arise slowly       Resting in bed with eyes closed, no complaints, no distress noted. Safety measures in place, will continue to monitor.

## 2018-05-15 NOTE — PROGRESS NOTES
Laboratory Monitoring for Antipsychotics: This patient is currently prescribed the following medication(s):   Current Facility-Administered Medications   Medication Dose Route Frequency    haloperidol (HALDOL) 2 mg/mL oral solution 10 mg  10 mg Oral TID    divalproex ER (DEPAKOTE ER) 24 hour tablet 1,250 mg  1,250 mg Oral QHS     The following labs have been completed for monitoring of antipsychotics and/or mood stabilizers:    Height, Weight, BMI Estimation  Estimated body mass index is 22.34 kg/(m^2) as calculated from the following:    Height as of this encounter: 188 cm (74\"). Weight as of this encounter: 78.9 kg (174 lb). Vital Signs/Blood Pressure  Visit Vitals    /78    Pulse 67    Temp 98 °F (36.7 °C)    Resp 16    Ht 188 cm (74\")    Wt 78.9 kg (174 lb)    SpO2 99%    BMI 22.34 kg/m2     Renal Function, Hepatic Function and Chemistry  CrCl cannot be calculated (Patient's most recent sCr result is older than the maximum 180 days allowed. ).     Lab Results   Component Value Date/Time    Sodium 135 (L) 12/12/2009 07:42 PM    Potassium 3.6 12/12/2009 07:42 PM    Chloride 99 12/12/2009 07:42 PM    CO2 27 12/12/2009 07:42 PM    Anion gap 9 12/12/2009 07:42 PM    BUN 7 12/12/2009 07:42 PM    Creatinine 0.8 12/12/2009 07:42 PM    BUN/Creatinine ratio 9 (L) 12/12/2009 07:42 PM     Lab Results   Component Value Date/Time    Glucose 118 (H) 12/12/2009 07:42 PM    Glucose (POC) 80 12/11/2013 12:39 PM     Lab Results   Component Value Date/Time    Hemoglobin A1c 5.3 05/15/2018 05:30 AM     Hematology  Lab Results   Component Value Date/Time    WBC 11.2 (H) 12/12/2009 07:42 PM    RBC 4.73 12/12/2009 07:42 PM    HGB 13.8 12/12/2009 07:42 PM    HCT 40.8 12/12/2009 07:42 PM    MCV 86.3 12/12/2009 07:42 PM    MCH 29.2 12/12/2009 07:42 PM    MCHC 33.8 12/12/2009 07:42 PM    RDW 13.0 12/12/2009 07:42 PM    PLATELET 903 07/72/6288 07:42 PM     Lipids  Lab Results   Component Value Date/Time Cholesterol, total 147 05/15/2018 05:30 AM    HDL Cholesterol 47 05/15/2018 05:30 AM    LDL, calculated 90 05/15/2018 05:30 AM    Triglyceride 50 05/15/2018 05:30 AM    CHOL/HDL Ratio 3.1 05/15/2018 05:30 AM     Thyroid Function  No results found for: TSH, TSH2, TSH3, TSHP, TSHELE, TT3, T3U, T3UP, FRT3, FT3, FT4, FT4P, T4, T4P, FT4T, TT7, TSHEXT    Assessment/Plan:  Recommended baseline laboratory monitoring has been completed based on this patient's current medication regimen. The patient's 10-year ASCVD risk was calculated as 4.3%. As this value is <7.5%, would not recommend initiating a statin at this time.      Yogi Oliver, PharmD, [facility], [facility], [address] [place] [address]
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
## 2018-05-15 NOTE — BH NOTES
GROUP THERAPY PROGRESS NOTE    Sofia Jones participated in the Acute Unit's afternoon Process Group for yesterday, with a focus on identifying feelings, planning for the rest of the day, and preparing for discharge. Group time: 40 minutes. Personal goal for participation: To increase the capacity to improve ones mood and structure. Goal orientation: The patient will be able to identify their feelings, develop a plan for structuring their day, and discharge planning. Group therapy participation: With prompting, this patient partially participated in the group. Therapeutic interventions reviewed and discussed: The group members were asked to introduce themselves to each other and to see if they could identify an emotion they are having and/or let the group know what they want to focus on for the day as they continue to make discharge plans. Impression of participation: The patient joined the group with about ten minutes left in the session and said, \"I'm feeling a little better [than yesterday]. \" He added that he was glad to be, \"Walking around. ..slowly. \" He may have been detained joining group because of his treatment team meeting and a telephone call he was making. He was alert and generally oriented. He expressed no current SI/HI and displayed no overt psychotic symptoms in this group. He did not appear to be so internally focused as he was the previous day. His range of affect is slightly better, although he still appeared depressed and anxious. His mood matched his affect.

## 2018-05-15 NOTE — BH NOTES
Infection control called   Plan to discontinue contact isolation   Triage hospitalist paged to review medications suggested  From culture results    Dr. Yayo Mann returned call   Plan to f/u with general surgery Dr. Thien Cobb  To review medications   Pt.  Continues to refuse HIV test and any medications at this time \"I will follow up when I leave\"
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
## 2018-05-15 NOTE — INTERDISCIPLINARY ROUNDS
Behavioral Health Interdisciplinary Rounds     Patient Name: Kaylene Bailey  Age: 40 y.o. Room/Bed:  734/  Primary Diagnosis: Schizophrenia ([facility] [address]   Admission Status: Involuntary Commitment     Readmission within 30 days: no  Power of  in place: no  Patient requires a blocked bed: no           Reason for blocked bed:     VTE Prophylaxis: Not indicated    Mobility needs/Fall risk: no    Nutritional Plan: no  Consults:          Labs/Testing due today?: yes    Sleep hours:  8      Participation in Care/Groups:  selective  Medication Compliant?: Yes  PRNS (last 24 hours): Pain    Restraints (last 24 hours):  no     CIWA (range last 24 hours): CIWA-Ar Total: 6   COWS (range last 24 hours): COWS Total: 7    Alcohol screening (AUDIT) completed -   AUDIT Score: 4     If applicable, date SBIRT discussed in treatment team AND documented:     Tobacco - patient is a smoker: Have You Used Tobacco in the Past 30 Days: Yes  Illegal Drugs use: Have You Used Any Illegal Substances Over the Past 12 Months: Yes    24 hour chart check complete: yes     Patient goal(s) for today:   Treatment team focus/goals: Plan to titrate his medications. Plan to have his  visit tomorrow regarding his baseline status   Progress note - he remains bizarre and delusional. He has been complaint with his medications.       LOS:  5  Expected LOS: TBD     Financial concerns/prescription coverage:    Date of last family contact: SW spoke to his  and she will visit tomorrow at 2:00      Family requesting physician contact today:   Discharge plan: he will return to his apartment   Guns in the home: no      [facility]     Participating treatment team members: Yann Johnson - Dr. Reji Torres Creshauna

## 2018-05-15 NOTE — PROGRESS NOTES
Problem: Depressed Mood (Adult/Pediatric)  Goal: *STG: Participates in treatment plan  Outcome: Progressing Towards Goal  Patient denies SI. Med and meal compliant. Patient seems to be less suspicious today. Patient mood is euthymic. Patient goal: \"to talk to the doctor about discharge\" Staff goal: to teach coping skills   Goal: *STG: Attends activities and groups  Outcome: Progressing Towards Goal  Attends groups   Goal: *STG: Remains safe in hospital  Outcome: Progressing Towards Goal  Q 15 min safety rounds. Group therapy. Medication management. Goal: *STG: Complies with medication therapy  Outcome: Progressing Towards Goal  Compliant  Goal: Interventions  Outcome: Progressing Towards Goal  Medication management. Q 15 min safety rounds. Group therapy.

## 2018-05-16 VITALS
RESPIRATION RATE: 16 BRPM | BODY MASS INDEX: 22.33 KG/M2 | DIASTOLIC BLOOD PRESSURE: 75 MMHG | HEART RATE: 76 BPM | HEIGHT: 74 IN | WEIGHT: 174 LBS | SYSTOLIC BLOOD PRESSURE: 117 MMHG | OXYGEN SATURATION: 99 % | TEMPERATURE: 98.4 F

## 2018-05-16 LAB — VALPROATE SERPL-MCNC: 68 UG/ML (ref 50–100)

## 2018-05-16 PROCEDURE — 36415 COLL VENOUS BLD VENIPUNCTURE: CPT | Performed by: PSYCHIATRY & NEUROLOGY

## 2018-05-16 PROCEDURE — 74011250637 HC RX REV CODE- 250/637: Performed by: PSYCHIATRY & NEUROLOGY

## 2018-05-16 PROCEDURE — 80164 ASSAY DIPROPYLACETIC ACD TOT: CPT | Performed by: PSYCHIATRY & NEUROLOGY

## 2018-05-16 RX ORDER — HYDROXYZINE 50 MG/1
50 TABLET, FILM COATED ORAL
Qty: 90 TAB | Refills: 0 | Status: SHIPPED | OUTPATIENT
Start: 2018-05-16 | End: 2018-05-26

## 2018-05-16 RX ORDER — HALOPERIDOL 10 MG/1
10 TABLET ORAL 3 TIMES DAILY
Qty: 90 TAB | Refills: 0 | Status: ON HOLD | OUTPATIENT
Start: 2018-05-16 | End: 2021-05-07

## 2018-05-16 RX ORDER — DIVALPROEX SODIUM 250 MG/1
1250 TABLET, EXTENDED RELEASE ORAL
Qty: 150 TAB | Refills: 0 | Status: ON HOLD | OUTPATIENT
Start: 2018-05-16 | End: 2021-05-07

## 2018-05-16 RX ADMIN — HALOPERIDOL 10 MG: 2 SOLUTION ORAL at 08:30

## 2018-05-16 RX ADMIN — HALOPERIDOL 10 MG: 2 SOLUTION ORAL at 16:13

## 2018-05-16 NOTE — INTERDISCIPLINARY ROUNDS
Behavioral Health Interdisciplinary Rounds     Patient Name: Kaylene Bailey  Age: 40 y.o. Room/Bed:  734/  Primary Diagnosis: Schizophrenia ([facility] [address]   Admission Status: Involuntary Commitment     Readmission within 30 days: no  Power of  in place: no  Patient requires a blocked bed: no          Reason for blocked bed:     VTE Prophylaxis: Not indicated    Mobility needs/Fall risk: no    Nutritional Plan: no  Consults:          Labs/Testing due today?: no    Sleep hours:  8.15      Participation in Care/Groups:  yes  Medication Compliant?: Yes  PRNS (last 24 hours): Sleep Aid    Restraints (last 24 hours):  no     CIWA (range last 24 hours): CIWA-Ar Total: 6   COWS (range last 24 hours): COWS Total: 7    Alcohol screening (AUDIT) completed -   AUDIT Score: 4     If applicable, date SBIRT discussed in treatment team AND documented:     Tobacco - patient is a smoker: Have You Used Tobacco in the Past 30 Days: Yes  Illegal Drugs use: Have You Used Any Illegal Substances Over the Past 12 Months: Yes    24 hour chart check complete: yes     Patient goal(s) for today:   Treatment team focus/goals: Plan to have his  visit today at 2:00. Progress note - He has been doing well on the unit. LOS:  6  Expected LOS: TBD    Financial concerns/prescription coverage:  Medicaid   Date of last family contact:     Family requesting physician contact today:   Discharge plan: He will return to his apartment at [facility]   Guns in the home:no       Outpatient provider(s): refer to [facility]     Participating treatment team members: Lizz Johnson Dr. - [address] [place] [address] Drive.

## 2018-05-16 NOTE — BH NOTES
GROUP THERAPY PROGRESS NOTE    Lizzette Levy is participating in Reflections. Group time: 10 minutes    Personal goal for participation: unit orientation and daily progress    Goal orientation: personal    Group therapy participation: minimal    Therapeutic interventions reviewed and discussed: yes    Impression of participation: Seems in fair spirits. Voiced no complaints. Isolating in room much. In bed resting.

## 2018-05-16 NOTE — BH NOTES
Behavioral Health Transition Record to Provider    Patient Name: Dania Cheney  YOB: 1975  Medical Record Number: [id number]  Date of Admission: 5/10/2018  Date of Discharge: 5/16/2018     Attending Provider: No att. providers found  Discharging Provider: Dr. Keith Poster   To contact this individual call [phone] and ask the  to page. If unavailable, ask to be transferred to [facility] Provider on call. Damion Dennise Provider will be available on call 24/7 and during holidays. Primary Care Provider: No primary care provider on file. No Known Allergies    Reason for Admission: \"psychosis\". Pt admitted under a temporary FDC order (TDO) severe psychosis and hill proving to be an imminent danger to self and others and an inability to care for self. Admission Diagnosis: Schizophrenia  Schizophrenia ([facility] [address]    * No surgery found *    Results for orders placed or performed during the hospital encounter of 05/10/18   CULTURE, WOUND W GRAM STAIN   Result Value Ref Range    Special Requests: NO SPECIAL REQUESTS      GRAM STAIN RARE WBCS SEEN      GRAM STAIN NO ORGANISMS SEEN      Culture result: MODERATE STAPHYLOCOCCUS AUREUS (A)      Culture result: MODERATE ALPHA STREPTOCOCCUS (A)      Culture result: (A)       MODERATE STAPHYLOCOCCUS SPECIES, COAGULASE NEGATIVE . ..(2 COLONY TYPES)       Susceptibility    Staphylococcus aureus - DESIREE     Ampicillin ($) <=2 Resistant ug/mL     Ampicillin/sulbactam ($) <=8/4 Susceptible ug/mL     Cefazolin ($) <=4 Susceptible ug/mL     Clindamycin ($) <=0.5 Susceptible ug/mL     Daptomycin ($$$$$) <=0.5 Susceptible ug/mL     Erythromycin ($$$$) <=0.5 Susceptible ug/mL     Gentamicin ($) <=4 Susceptible ug/mL     Linezolid ($$$$$) 2 Susceptible ug/mL     Oxacillin <=0.25 Susceptible ug/mL     Rifampin ($$$$)* <=1 Susceptible ug/mL      * Rifampin is not to be used for mono-therapy.      Tetracycline <=4 Susceptible ug/mL     Trimeth/Sulfa <=0.5/9.5 Susceptible ug/mL     Vancomycin ($) 2 Susceptible ug/mL     Ciprofloxacin ($) <=1 Susceptible ug/mL   LIPID PANEL   Result Value Ref Range    LIPID PROFILE          Cholesterol, total 147 <200 MG/DL    Triglyceride 50 <150 MG/DL    HDL Cholesterol 47 MG/DL    LDL, calculated 90 0 - 100 MG/DL    VLDL, calculated 10 MG/DL    CHOL/HDL Ratio 3.1 0 - 5.0     HEMOGLOBIN A1C WITH EAG   Result Value Ref Range    Hemoglobin A1c 5.3 4.2 - 6.3 %    Est. average glucose 105 mg/dL   VALPROIC ACID   Result Value Ref Range    Valproic acid 68 50 - 100 ug/ml       Immunizations administered during this encounter: There is no immunization history for the selected administration types on file for this patient. Screening for Metabolic Disorders for Patients on Antipsychotic Medications  (Data obtained from the EMR)    Estimated Body Mass Index  Estimated body mass index is 22.34 kg/(m^2) as calculated from the following:    Height as of this encounter: 6' 2\" (1.88 m). Weight as of this encounter: 78.9 kg (174 lb). Vital Signs/Blood Pressure  Visit Vitals    /75    Pulse 76    Temp 98.4 °F (36.9 °C)    Resp 16    Ht 6' 2\" (1.88 m)    Wt 78.9 kg (174 lb)    SpO2 99%    BMI 22.34 kg/m2       Blood Glucose/Hemoglobin A1c  Lab Results   Component Value Date/Time    Glucose 118 (H) 12/12/2009 07:42 PM    Glucose (POC) 80 12/11/2013 12:39 PM       Lab Results   Component Value Date/Time    Hemoglobin A1c 5.3 05/15/2018 05:30 AM        Lipid Panel  Lab Results   Component Value Date/Time    Cholesterol, total 147 05/15/2018 05:30 AM    HDL Cholesterol 47 05/15/2018 05:30 AM    LDL, calculated 90 05/15/2018 05:30 AM    Triglyceride 50 05/15/2018 05:30 AM    CHOL/HDL Ratio 3.1 05/15/2018 05:30 AM        Discharge Diagnosis: Schizophrenia     Discharge Plan: He will be discharge to his mothers home. The patient Irasema Granda exhibits the ability to control behavior in a less restrictive environment. Patient's level of functioning is improving. No assaultive/destructive behavior has been observed for the past 24 hours. No suicidal/homicidal threat or behavior has been observed for the past 24 hours. There is no evidence of serious medication side effects. Patient has not been in physical or protective restraints for at least the past 24 hours. If weapons involved, how are they secured? No weapons involved     Is patient aware of and in agreement with discharge plan? Patient is aware of discharge and is in agreement     Arrangements for medication:  Prescriptions called into [facility] . Referral for substance abuse treatment ? Yes - referral to [facility]     Referral for smoking cessation needed ? Yes, refused     Copy of discharge instructions to  provider?:  Yes, fax to [address] [place] [address] Drive and the [facility]     Arrangements for transportation home:  Taxi     Keep all follow up appointments as scheduled, continue to take prescribed medications per physician instructions. Mental health crisis number:  [phone] or your local mental health crisis line number at [phone]            Discharge Medication List and Instructions:   Discharge Medication List as of 5/16/2018  2:44 PM      START taking these medications    Details   hydrOXYzine HCl (ATARAX) 50 mg tablet Take 1 Tab by mouth three (3) times daily as needed for Itching for up to 10 days. Indications: anxiety, Print, Disp-90 Tab, R-0      divalproex ER (DEPAKOTE ER) 250 mg ER tablet Take 5 Tabs by mouth nightly. Indications: BIPOLAR DISORDER IN REMISSION, Print, Disp-150 Tab, R-0      haloperidol (HALDOL) 10 mg tablet Take 1 Tab by mouth three (3) times daily.  Indications: Schizophrenia, Print, Disp-90 Tab, R-0             Unresulted Labs     None        To obtain results of studies pending at discharge, please contact [phone]    Follow-up Information     Follow up With Details Comments [id number] On 5/17/2018 walk in for treatment  [address]  [phone]    [address] [place] [address] Drive  On 5/17/2018 Lieutenant Brock will pick you up at your mothers home  [address] [facility] [address], [place]  Hours: Open · Closes 5PM  Phone: [phone]  fax [phone]          Advanced Directive:   Does the patient have an appointed surrogate decision maker? No  Does the patient have a Medical Advance Directive? No  Does the patient have a Psychiatric Advance Directive? No  If the patient does not have a surrogate or Medical Advance Directive AND Psychiatric Advance Directive, the patient was offered information on these advance directives Patient declined to complete    Patient Instructions: Please continue all medications until otherwise directed by physician. Tobacco Cessation Discharge Plan:   Is the patient a smoker and needs referral for smoking cessation? Yes  Patient referred to the following for smoking cessation with an appointment? Refused     Patient was offered medication to assist with smoking cessation at discharge? Refused  Was education for smoking cessation added to the discharge instructions? Yes    Alcohol/Substance Abuse Discharge Plan:   Does the patient have a history of substance/alcohol abuse and requires a referral for treatment? Yes  Patient referred to the following for substance/alcohol abuse treatment with an appointment? Yes- referred to [facility]   Patient was offered medication to assist with alcohol cessation at discharge? No  Was education for substance/alcohol abuse added to discharge instructions? No    Patient discharged to Home; discussed with patient/caregiver and provided to the patient/caregiver either in hard copy or electronically.

## 2018-05-16 NOTE — BH NOTES
GROUP THERAPY PROGRESS NOTE    The patient Nilda Steven is participating in [employer]. Group time: 30 minutes    Personal goal for participation: to orient the patient to the unit.     Goal orientation: successful adoption of unit rules    Group therapy participation: active    Therapeutic interventions reviewed and discussed: Yes    Impression of participation:     Erin Fleischer  5/16/2018 10:22 AM
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
## 2018-05-16 NOTE — BH NOTES
GROUP THERAPY PROGRESS NOTE    Mitra Junior participated in the Acute Unit's afternoon Process Group for yesterday, with a focus on identifying feelings, planning for the rest of the day, and preparing for discharge. Group time: 40 minutes. Personal goal for participation: To increase the capacity to improve ones mood and structure. Goal orientation: The patient will be able to identify their feelings, develop a plan for structuring their day, and discharge planning. Group therapy participation: With prompting, this patient participated in the group. Therapeutic interventions reviewed and discussed: The group members were asked to introduce themselves to each other and to see if they could identify an emotion they are having and/or let the group know what they want to focus on for the day as they continue to make discharge plans. Impression of participation: The patient said he was doing \"better\" and that he wanted to \"take things lighter. \" He listened to most of his peers share before he spoke up. He was alert, generally oriented, and quiet. He did not appear to be religiously preoccupied or as paranoid as he was when he first entered the hospital. He remained somewhat guarded but expressed no current SI/HI. He also displayed no overt psychotic symptoms in this group, aside from his hesitancy to share very much. His affect was non-dysphoric and his mood was calm and cooperative. His faced looked a little less strained than it did when he first entered the unit.

## 2018-05-16 NOTE — BH NOTES
PRN Medication Documentation    Specific patient behavior that led to need for PRN medication: pt asked for Ambien to help him sleep  Staff interventions attempted prior to PRN being given: encouraged pt to lay down 30min ago after med admin  PRN medication given: 10mg Ambien  Patient response/effectiveness of PRN medication: pt to room to lay down

## 2018-05-16 NOTE — PROGRESS NOTES
Brendayanira Haider actively participated in [address] about peace on acute [facility] unit    [address] M.Div, M.S, [place] [address] available at [address] [place] [phone])

## 2018-05-16 NOTE — PROGRESS NOTES
Pharmacist Discharge Medication Reconciliation    Discharging Provider: Dr. Noam Tejada PMH:   Past Medical History:   Diagnosis Date    Aggressive outburst     Marijuana abuse 7/22/2010    Psychiatric disorder     schizophrenia    Psychotic disorder      Chief Complaint for this Admission: No chief complaint on file. Allergies: Review of patient's allergies indicates no known allergies. Discharge Medications:   Current Discharge Medication List        START taking these medications    Details   hydrOXYzine HCl (ATARAX) 50 mg tablet Take 1 Tab by mouth three (3) times daily as needed for Itching for up to 10 days. Indications: anxiety  Qty: 90 Tab, Refills: 0      divalproex ER (DEPAKOTE ER) 250 mg ER tablet Take 5 Tabs by mouth nightly. Indications: BIPOLAR DISORDER IN REMISSION  Qty: 150 Tab, Refills: 0      haloperidol (HALDOL) 10 mg tablet Take 1 Tab by mouth three (3) times daily.  Indications: Schizophrenia  Qty: 90 Tab, Refills: 0           The patient's chart, MAR and AVS were reviewed by Alejandro Suggs, PREM[facility].

## 2018-05-16 NOTE — DISCHARGE SUMMARY
PSYCHIATRIC DISCHARGE SUMMARY         IDENTIFICATION:    Patient Name  Kezia Velázquez   Date of Birth 1975   [facility] [id number]   Medical Record Number  [id number]      Age  40 y.o. PCP Jeanmarie Pettit MD   Admit date:  5/10/2018    Discharge date: 5/16/2018   Room Number  734/01  @ [facility]   Date of Service  5/16/2018               TYPE OF DISCHARGE: REGULAR               CONDITION AT DISCHARGE: good       PROVISIONAL & DISCHARGE DIAGNOSES:    Problem List  Never Reviewed          Codes Class    * (Principal)Schizophrenia ([facility] ICD-10-CM: F20.9  ICD-9-CM: 295.90         Tobacco dependence ICD-10-CM: F17.200  ICD-9-CM: 305.1         Disorganized schizophrenia, chronic condition with acute exacerbation ([facility] (Chronic) ICD-10-CM: F20.1  ICD-9-CM: 295.14 Acute        Non-compliance with treatment ICD-10-CM: Z91.19  ICD-9-CM: V15.81         Marijuana abuse ICD-10-CM: F12.10  ICD-9-CM: 305.20               Active Hospital Problems    *Schizophrenia ([facility]        DISCHARGE DIAGNOSIS:   Axis I:  SEE ABOVE  Axis II: SEE ABOVE  Axis III: SEE ABOVE  Axis IV:  lack of structure  Axis V:  50 on admission, 60 on discharge 60(baseline)       CC & HISTORY OF PRESENT ILLNESS:  40year old AA male admitted for severe schizoaffective D/O. PT responded to Depakote and haldol. At discharge he denied SI/HI intent and plan and did not meet TDO criteria. SOCIAL HISTORY:    Social History     Social History    Marital status: SINGLE     Spouse name: N/A    Number of children: N/A    Years of education: N/A     Occupational History    Not on file. Social History Main Topics    Smoking status: Current Every Day Smoker     Packs/day: 1.00     Years: 10.00    Smokeless tobacco: Never Used    Alcohol use No    Drug use: Yes     Special: Marijuana, Heroin    Sexual activity: Not Currently     Other Topics Concern    Not on file     Social History Narrative    40year old AA male admitted for psychosis.  Pt has had multiple psychiatric admissions and has a history of non compliance with out patient treatment. Pt abuses THC. He is followed by [facility]. FAMILY HISTORY:   Family History   Problem Relation Age of Onset    Depression Neg Hx     Suicide Neg Hx     Psychotic Disorder Neg Hx     Substance Abuse Neg Hx     Dementia Neg Hx              HOSPITALIZATION COURSE:    Mauri Dumont was admitted [facility] the inpatient [facility] for acute psychiatric stabilization in regards to symptomatology as described in the HPI above. he differential diagnosis at time of admission included: bipolar dis vs. schizoaffective vs. Schizophrenia. While on the unit Mauri Dumont was involved in individual, group, occupational and milieu therapy. Psychiatric medications were adjusted during this hospitalization including depakote. Mauri Dumont demonstrated a slow, but progressive improvement in overall condition. Much of patient's depression appeared to be related to situational stressors and psychological factors. Please see individual progress notes for more specific details regarding patient's hospitalization course. At time of dc, Mauri Dumont was without significant problems with depression psychosis  hill. Overall presentation at time of discharge is most consistent with the diagnosis of bipolar disorder Patient with request for discharge today. There are no grounds to seek a TDO. Patient has maximized benefit to be derived from acute inpatient psychiatric treatment.   All members of the treatment team concur with each other in regards to plans for discharge today per patient's request.          LABS AND IMAGAING:    Labs Reviewed   CULTURE, WOUND W GRAM STAIN - Abnormal; Notable for the following:        Result Value    Culture result: MODERATE STAPHYLOCOCCUS AUREUS (*)     Culture result: MODERATE ALPHA STREPTOCOCCUS (*)     Culture result:   (*)     Value: MODERATE STAPHYLOCOCCUS SPECIES, COAGULASE NEGATIVE . ..(2 COLONY TYPES)    All other components within normal limits   LIPID PANEL   HEMOGLOBIN A1C WITH EAG   VALPROIC ACID     Admission on 05/10/2018   Component Date Value Ref Range Status    Special Requests: 05/13/2018 NO SPECIAL REQUESTS    Final    GRAM STAIN 05/13/2018 RARE WBCS SEEN    Final    GRAM STAIN 05/13/2018 NO ORGANISMS SEEN    Final    Culture result: 05/13/2018 MODERATE STAPHYLOCOCCUS AUREUS*   Final    Culture result: 05/13/2018 MODERATE ALPHA STREPTOCOCCUS*   Final    Culture result: 05/13/2018 MODERATE STAPHYLOCOCCUS SPECIES, COAGULASE NEGATIVE . ..(2 COLONY TYPES)*   Final    LIPID PROFILE 05/15/2018        Final    Cholesterol, total 05/15/2018 147  <200 MG/DL Final    Triglyceride 05/15/2018 50  <150 MG/DL Final    HDL Cholesterol 05/15/2018 47  MG/DL Final    LDL, calculated 05/15/2018 90  0 - 100 MG/DL Final    VLDL, calculated 05/15/2018 10  MG/DL Final    CHOL/HDL Ratio 05/15/2018 3.1  0 - 5.0   Final    Hemoglobin A1c 05/15/2018 5.3  4.2 - 6.3 % Final    Est. average glucose 05/15/2018 105  mg/dL Final     No results found. DISPOSITION:    Home. Patient to f/u with o/p psychiatric, and psychotherapy appointments. Patient is to f/u with internist as directed. Patient should have a depakote level and associated labs checked within the next 1-2 weeks by patient's o/p psychiatrist/internist.               FOLLOW-UP CARE:    Activity as tolerated  Regular Diet  Wound Care: none needed. Follow-up Information     Follow up With Details Comments Cathleen, [address]  [address]  [facility] [phone]                   PROGNOSIS:  Greatly dependent upon patient's ability to remain sober and to f/u with o/p psychiatric/psychotherapy appointments as well as to comply with psychiatric medications as prescribed. Patient denies suicidal or homicidal ideations.  Yayo Shukla fully contracts for safety. Patient reports many positive predictive factors in terms of not attempting suicide or homicide. Patient appears to be at low risk of suicide or homicide. Patient and family are aware and in agreement with discharge and discharge plan. DISCHARGE MEDICATIONS: (no changes made). Informed consent given for the use of following psychotropic medications:  Current Discharge Medication List      START taking these medications    Details   hydrOXYzine HCl (ATARAX) 50 mg tablet Take 1 Tab by mouth three (3) times daily as needed for Itching for up to 10 days. Indications: anxiety  Qty: 90 Tab, Refills: 0      divalproex ER (DEPAKOTE ER) 250 mg ER tablet Take 5 Tabs by mouth nightly. Indications: BIPOLAR DISORDER IN REMISSION  Qty: 150 Tab, Refills: 0      haloperidol (HALDOL) 10 mg tablet Take 1 Tab by mouth three (3) times daily. Indications: Schizophrenia  Qty: 90 Tab, Refills: 0                    A coordinated, multidisplinary treatment team round was conducted with Jacques Jean---this is done daily here at [facility] . This team consists of the nurse, psychiatric unit pharmcist,  and writer. I have spent greater than 35 minutes on discharge work.     Signed:  Esequiel Tavera MD  5/16/2018

## 2018-05-16 NOTE — PROGRESS NOTES
0945 2 nurses attempted lab draw with no success. 1000 Left message for IV TEAM  IV team stated they could not come to floor. Called ICU, they are unable to come to floor  1011 Received Arterial stick order. Respiratory will be 5-10 minutes      Problem: Altered Thought Process (Adult/Pediatric)  Goal: *STG: Participates in treatment plan  Outcome: Progressing Towards Goal  Meets with treatment team, some loose association.  to see pt today to determine if at baseline. Goal: *STG: Complies with medication therapy  Outcome: Progressing Towards Goal  Medication compliant  Goal: *STG: Attends activities and groups  Outcome: Progressing Towards Goal  Attends groups  Goal: Interventions  Outcome: Progressing Towards Goal  Staff teaching coping skills    Problem: Falls - Risk of  Goal: *Absence of Falls  Document Fredy Fall Risk and appropriate interventions in the flowsheet.    Outcome: Progressing Towards Goal  Fall Risk Interventions:            Medication Interventions: Teach patient to arise slowly

## 2018-05-16 NOTE — BH NOTES
1615 all pt belongings have been returned and discharge instructions have been reviewed. Staff escorts pt off unit to discharge area to meet taxi cab.

## 2018-05-16 NOTE — DISCHARGE INSTRUCTIONS
DISCHARGE SUMMARY    NAME:Calvin Jean  : 1975  MRN: [id number]    The patient Teresita Bradford exhibits the ability to control behavior in a less restrictive environment. Patient's level of functioning is improving. No assaultive/destructive behavior has been observed for the past 24 hours. No suicidal/homicidal threat or behavior has been observed for the past 24 hours. There is no evidence of serious medication side effects. Patient has not been in physical or protective restraints for at least the past 24 hours. If weapons involved, how are they secured? No weapons involved     Is patient aware of and in agreement with discharge plan? Patient is aware of discharge and is in agreement     Arrangements for medication:  Prescriptions called into [facility] . Referral for substance abuse treatment ? Yes - referral to the [facility]     Referral for smoking cessation needed ? Yes, refused     Copy of discharge instructions to  provider?:  Yes, fax to [address] [place] [address] Drive and the [facility]     Arrangements for transportation home:  Taxi     Keep all follow up appointments as scheduled, continue to take prescribed medications per physician instructions. Mental health crisis number:  [phone] or your local mental health crisis line number at [phone]       . DISCHARGE SUMMARY from Nurse    PATIENT INSTRUCTIONS:    What to do at Home:  Recommended activity: Activity as tolerated,     If you experience any of the following symptoms , thoughts of hurting self or others, hearing or seeing things that ar not there, please follow up with Mental Health Crisis [phone]. *  Please give a list of your current medications to your Primary Care Provider. *  Please update this list whenever your medications are discontinued, doses are      changed, or new medications (including over-the-counter products) are added.     *  Please carry medication information at all times in case of emergency situations. These are general instructions for a healthy lifestyle:    No smoking/ No tobacco products/ Avoid exposure to second hand smoke  Surgeon General's Warning:  Quitting smoking now greatly reduces serious risk to your health. Obesity, smoking, and sedentary lifestyle greatly increases your risk for illness    A healthy diet, regular physical exercise & weight monitoring are important for maintaining a healthy lifestyle    You may be retaining fluid if you have a history of heart failure or if you experience any of the following symptoms:  Weight gain of 3 pounds or more overnight or 5 pounds in a week, increased swelling in our hands or feet or shortness of breath while lying flat in bed. Please call your doctor as soon as you notice any of these symptoms; do not wait until your next office visit. Recognize signs and symptoms of STROKE:    F-face looks uneven    A-arms unable to move or move unevenly    S-speech slurred or non-existent    T-time-call 911 as soon as signs and symptoms begin-DO NOT go       Back to bed or wait to see if you get better-TIME IS BRAIN. Warning Signs of HEART ATTACK     Call 911 if you have these symptoms:   Chest discomfort. Most heart attacks involve discomfort in the center of the chest that lasts more than a few minutes, or that goes away and comes back. It can feel like uncomfortable pressure, squeezing, fullness, or pain.  Discomfort in other areas of the upper body. Symptoms can include pain or discomfort in one or both arms, the back, neck, jaw, or stomach.  Shortness of breath with or without chest discomfort.  Other signs may include breaking out in a cold sweat, nausea, or lightheadedness. Don't wait more than five minutes to call 911 - MINUTES MATTER! Fast action can save your life. Calling 911 is almost always the fastest way to get lifesaving treatment.  Emergency Medical Services staff can begin treatment when they arrive -- up to an hour sooner than if someone gets to the hospital by car. The discharge information has been reviewed with the patient. The patient verbalized understanding. Discharge medications reviewed with the patient and appropriate educational materials and side effects teaching were provided.   ___________________________________________________________________________________________________________________________________

## 2021-05-06 ENCOUNTER — HOSPITAL ENCOUNTER (INPATIENT)
Age: 46
LOS: 5 days | Discharge: HOME | DRG: 750 | End: 2021-05-11
Attending: PSYCHIATRY & NEUROLOGY | Admitting: PSYCHIATRY & NEUROLOGY
Payer: MEDICAID

## 2021-05-06 DIAGNOSIS — F25.0 SCHIZOAFFECTIVE DISORDER, BIPOLAR TYPE (HCC): ICD-10-CM

## 2021-05-06 DIAGNOSIS — F14.10 COCAINE USE DISORDER, MILD, ABUSE (HCC): ICD-10-CM

## 2021-05-06 DIAGNOSIS — F14.10 COCAINE USE DISORDER (HCC): ICD-10-CM

## 2021-05-06 PROBLEM — F29 PSYCHOSIS (HCC): Status: ACTIVE | Noted: 2021-05-06

## 2021-05-06 PROCEDURE — 65220000003 HC RM SEMIPRIVATE PSYCH

## 2021-05-06 RX ORDER — DIPHENHYDRAMINE HYDROCHLORIDE 50 MG/ML
50 INJECTION, SOLUTION INTRAMUSCULAR; INTRAVENOUS
Status: DISCONTINUED | OUTPATIENT
Start: 2021-05-06 | End: 2021-05-11 | Stop reason: HOSPADM

## 2021-05-06 RX ORDER — HYDROXYZINE 25 MG/1
50 TABLET, FILM COATED ORAL
Status: DISCONTINUED | OUTPATIENT
Start: 2021-05-06 | End: 2021-05-11 | Stop reason: HOSPADM

## 2021-05-06 RX ORDER — ACETAMINOPHEN 325 MG/1
650 TABLET ORAL
Status: DISCONTINUED | OUTPATIENT
Start: 2021-05-06 | End: 2021-05-11 | Stop reason: HOSPADM

## 2021-05-06 RX ORDER — TRAZODONE HYDROCHLORIDE 50 MG/1
50 TABLET ORAL
Status: DISCONTINUED | OUTPATIENT
Start: 2021-05-06 | End: 2021-05-11 | Stop reason: HOSPADM

## 2021-05-06 RX ORDER — LORAZEPAM 2 MG/ML
1 INJECTION INTRAMUSCULAR
Status: DISCONTINUED | OUTPATIENT
Start: 2021-05-06 | End: 2021-05-11 | Stop reason: HOSPADM

## 2021-05-06 RX ORDER — HALOPERIDOL 5 MG/ML
5 INJECTION INTRAMUSCULAR
Status: DISCONTINUED | OUTPATIENT
Start: 2021-05-06 | End: 2021-05-11 | Stop reason: HOSPADM

## 2021-05-06 RX ORDER — ADHESIVE BANDAGE
30 BANDAGE TOPICAL DAILY PRN
Status: DISCONTINUED | OUTPATIENT
Start: 2021-05-06 | End: 2021-05-11 | Stop reason: HOSPADM

## 2021-05-06 RX ORDER — BENZTROPINE MESYLATE 1 MG/1
1 TABLET ORAL
Status: DISCONTINUED | OUTPATIENT
Start: 2021-05-06 | End: 2021-05-11 | Stop reason: HOSPADM

## 2021-05-06 RX ORDER — OLANZAPINE 5 MG/1
5 TABLET ORAL
Status: DISCONTINUED | OUTPATIENT
Start: 2021-05-06 | End: 2021-05-11 | Stop reason: HOSPADM

## 2021-05-07 PROBLEM — F14.10 COCAINE USE DISORDER, MILD, ABUSE (HCC): Status: ACTIVE | Noted: 2021-05-07

## 2021-05-07 PROBLEM — F20.9 SCHIZOPHRENIA (HCC): Status: RESOLVED | Noted: 2018-05-10 | Resolved: 2021-05-07

## 2021-05-07 PROBLEM — F29 PSYCHOSIS (HCC): Status: RESOLVED | Noted: 2021-05-06 | Resolved: 2021-05-07

## 2021-05-07 PROBLEM — F25.0 SCHIZOAFFECTIVE DISORDER, BIPOLAR TYPE (HCC): Status: ACTIVE | Noted: 2021-05-07

## 2021-05-07 PROCEDURE — 74011250637 HC RX REV CODE- 250/637: Performed by: PSYCHIATRY & NEUROLOGY

## 2021-05-07 PROCEDURE — 99223 1ST HOSP IP/OBS HIGH 75: CPT | Performed by: PSYCHIATRY & NEUROLOGY

## 2021-05-07 PROCEDURE — 74011250637 HC RX REV CODE- 250/637: Performed by: NURSE PRACTITIONER

## 2021-05-07 PROCEDURE — 65220000003 HC RM SEMIPRIVATE PSYCH

## 2021-05-07 RX ORDER — LITHIUM CARBONATE 450 MG/1
900 TABLET ORAL
Status: DISCONTINUED | OUTPATIENT
Start: 2021-05-07 | End: 2021-05-11 | Stop reason: HOSPADM

## 2021-05-07 RX ORDER — BENZTROPINE MESYLATE 1 MG/1
1 TABLET ORAL DAILY
COMMUNITY
End: 2021-05-11

## 2021-05-07 RX ORDER — FLUPHENAZINE HYDROCHLORIDE 5 MG/1
5 TABLET ORAL DAILY
Status: DISCONTINUED | OUTPATIENT
Start: 2021-05-08 | End: 2021-05-11 | Stop reason: HOSPADM

## 2021-05-07 RX ORDER — OLANZAPINE 5 MG/1
10 TABLET, ORALLY DISINTEGRATING ORAL
Status: DISCONTINUED | OUTPATIENT
Start: 2021-05-07 | End: 2021-05-11 | Stop reason: HOSPADM

## 2021-05-07 RX ORDER — LITHIUM CARBONATE 300 MG/1
600 TABLET, FILM COATED, EXTENDED RELEASE ORAL DAILY
Status: DISCONTINUED | OUTPATIENT
Start: 2021-05-08 | End: 2021-05-11 | Stop reason: HOSPADM

## 2021-05-07 RX ORDER — DIPHENHYDRAMINE HCL 25 MG
50 CAPSULE ORAL
Status: DISCONTINUED | OUTPATIENT
Start: 2021-05-07 | End: 2021-05-11 | Stop reason: HOSPADM

## 2021-05-07 RX ORDER — FLUPHENAZINE DECANOATE 25 MG/ML
25 INJECTION, SOLUTION INTRAMUSCULAR; SUBCUTANEOUS EVERY 2 WEEKS
Status: ON HOLD | COMMUNITY
End: 2021-05-11 | Stop reason: SDUPTHER

## 2021-05-07 RX ORDER — OLANZAPINE 5 MG/1
5 TABLET, ORALLY DISINTEGRATING ORAL DAILY
Status: DISCONTINUED | OUTPATIENT
Start: 2021-05-08 | End: 2021-05-07

## 2021-05-07 RX ORDER — FLUPHENAZINE HYDROCHLORIDE 10 MG/1
10 TABLET ORAL 2 TIMES DAILY
COMMUNITY
End: 2021-05-11

## 2021-05-07 RX ADMIN — OLANZAPINE 10 MG: 5 TABLET, ORALLY DISINTEGRATING ORAL at 22:09

## 2021-05-07 RX ADMIN — LITHIUM CARBONATE 900 MG: 450 TABLET, EXTENDED RELEASE ORAL at 22:09

## 2021-05-07 RX ADMIN — HYDROXYZINE HYDROCHLORIDE 50 MG: 25 TABLET, FILM COATED ORAL at 11:42

## 2021-05-07 RX ADMIN — TRAZODONE HYDROCHLORIDE 50 MG: 50 TABLET ORAL at 22:09

## 2021-05-07 RX ADMIN — OLANZAPINE 5 MG: 5 TABLET, FILM COATED ORAL at 11:42

## 2021-05-07 RX ADMIN — DIPHENHYDRAMINE HYDROCHLORIDE 50 MG: 25 CAPSULE ORAL at 22:09

## 2021-05-07 NOTE — BH NOTES
PSYCHOSOCIAL ASSESSMENT  :Patient identifying info:   Michael Gonzales is a 41 y.o., male admitted 5/6/2021  9:20 PM     Presenting problem and precipitating factors: Patient admitted under a TDO for psychosis and delusions. He was at a store threatening to blow it up with a bomb and that the people in the store were trespassing. He reports that he is Allah and is delusional and psychotic during assessment. He reports that he sees PACT team but they are his enemies. He continues to tell people he is Allah and to address him as such. Mental status assessment: Patient is psychotic, delusional and he is not oriented to person and other orientation questions were not answered. He is hyperverbal.    Strengths: PACT team    Collateral information: [facility] PACT team- [place]    Current psychiatric /substance abuse providers and contact info: [facility] PACT team- Sanju    Previous psychiatric/substance abuse providers and response to treatment: Multiple prior admissions at various facilities including [facility], [facility] in 2018 and [facility] in 2010x2, 2011, 2012, 2013, 2014, 2015, 2016     Family history of mental illness or substance abuse: Unable to assess but per chart review yes, unknown family member    Substance abuse history:  +Cocaine, Negative BAL  Social History     Tobacco Use    Smoking status: Current Every Day Smoker     Packs/day: 1.00     Years: 10.00     Pack years: 10.00    Smokeless tobacco: Never Used   Substance Use Topics    Alcohol use: No       History of biomedical complications associated with substance abuse: none reported    Patient's current acceptance of treatment or motivation for change: admitted under TDO    Family constellation: mother and possible siblings? Is significant other involved?  No      Describe support system: PACT team    Describe living arrangements and home environment: patient reports having a place to live, questionable historian at this time    Health issues:   Past Medical History: Diagnosis Date    Aggressive outburst     Marijuana abuse 2010    Psychiatric disorder     schizophrenia    Psychotic disorder      Hospital Problems  Never Reviewed          Codes Class Noted POA    Psychosis ([facility] ICD-10-CM: F29  ICD-9-CM: 298.9  2021 Unknown        Non-compliance with treatment ICD-10-CM: Z91.19  ICD-9-CM: V15.81  2010 Yes        Marijuana abuse ICD-10-CM: F12.10  ICD-9-CM: 305.20  2010 Yes              Trauma history: none reported    Legal issues: none reported    History of  service: Na    Financial status:  disability    Caodaism/cultural factors: reports he is All    Education/work history: HS and disability    Have you been licensed as a health care professional (current or ):  No    Leisure and recreation preferences: watching tv or playing video games    Describe coping skills: ineffective and poor judgement    [facility]  2021

## 2021-05-07 NOTE — PROGRESS NOTES
Problem: Paranoid Ideation  Goal: *LTG: Interact with others without defensiveness or anger  Outcome: Not Progressing Towards Goal  Goal: *LTG: Verbalize trust of significant other & eliminate accusations of disloyalty  Outcome: Not Progressing Towards Goal  Goal: *LTG: Report reduced vigilance & suspicion around others as well as more relaxed, trusting, & open interaction  Outcome: Not Progressing Towards Goal     Problem: Falls - Risk of  Goal: *Absence of Falls  Description: Document Fredy Fall Risk and appropriate interventions in the flowsheet.   Outcome: Progressing Towards Goal  Note: Fall Risk Interventions:  Mobility Interventions: Utilize walker, cane, or other assistive device    Mentation Interventions: Reorient patient

## 2021-05-07 NOTE — PROGRESS NOTES
Patient is a 41year old male admitted TDO at 2130 for psychosis. Patient was placed under [employer] after store employee reported that patient threatened that he had a bomb and told everyone they were tresspassing. Patient became uncooperative with the police and [employer]. Patient believes he is God. He had a crack pipe in his possession at that time. Patient is alert oriented to person and place. He is irritable and labile at times during the assessment. Patient reported that his name is [place]. \" He is unkempt and disheveled with poor hygiene. Patient has multiple inpatient treatment hospitalizations for same as this one. He has an extensive drug use history and has hard veins to obtain blood from. Patient denied any allergies to medications or foods. Patient is a daily tobacco smoker but denied wanting a nicotine patch. He denied drinking alcohol or illicit drug use. Patient's UDS positive for cocaine. Patient denied depression, anxiety, SI/HI, and AVH at this time. He denied any sleep or appetite abnormalities. Skin assessment performed. No skin abnormalities noted. Patient belongings and clothing searched along with body search. No contraband found. Orders obtained from Jae Rice NP. Q15 minute checks initiated per provider. No distress noted.

## 2021-05-07 NOTE — BH NOTES
Report received from off going nurse. Upon assessing patient it is noted tht he is guarded and demanding. He stated tht he is God and that he should be able to have juice and food whenever he wants. Pt refused to complete vital signs but denied wanting to harm himself or others. He would not elaborate on feelings of depression or anxiety. PT was encouraged to participate in treatment and take responsibility for healing. Importance of completing ADL's, medications, and group involvement was stressed to patient. He remained unresponsive and refused to acknowledge this . Monitoring will continue to attempt to obtain vital signs, monitor for safety, and changes in condition.

## 2021-05-07 NOTE — GROUP NOTE
[facility] GROUP DOCUMENTATION INDIVIDUAL Group Therapy Note Date: 5/7/2021 Group Start Time: 0900 Group End Time: 1000 Group Topic: Topic Group [facility] 3 ACUTE BEHAV [facility], [address] GROUP DOCUMENTATION GROUP Group Therapy Note Attendees: 5 Attendance: Did not attend Patient's Goal: Interventions/techniques Kimberlee Sanders

## 2021-05-07 NOTE — PROGRESS NOTES
Problem: Patient Education: Go to Patient Education Activity  Goal: Patient/Family Education  Outcome: Progressing Towards Goal     Problem: Paranoid Ideation  Goal: *LTG: Show more trust in others by speaking positively of them & reporting comfort in socializing  Outcome: Progressing Towards Goal  Goal: *LTG: Interact with others without defensiveness or anger  Outcome: Progressing Towards Goal  Goal: *LTG: Verbalize trust of significant other & eliminate accusations of disloyalty  Outcome: Progressing Towards Goal  Goal: *LTG: Report reduced vigilance & suspicion around others as well as more relaxed, trusting, & open interaction  Outcome: Progressing Towards Goal  Goal: *STG: Identify core belief that others are untrustworthy & malicious  Outcome: Progressing Towards Goal  Goal: *STG: Make verbal connection between fears of others & own feelings of inadequacy  Outcome: Progressing Towards Goal  Goal: *STG: Agree to collaborate with therapist in probing feelings of vulnerability  Outcome: Progressing Towards Goal  Goal: *STG: Identify historical sources of feelings of vulnerability  Outcome: Progressing Towards Goal  Goal: *STG: Acknowledge that belief about others being threatening is based on more subjective interpretation than on objective data  Outcome: Progressing Towards Goal  Goal: *STG: Verbalize trust of significant other & feel relaxed when not in his/her presence  Outcome: Progressing Towards Goal  Goal: *STG: Stop being accusatory of others  Outcome: Progressing Towards Goal  Goal: *STG:Report interacting socially without fear or suspicion  Outcome: Progressing Towards Goal  Goal: *Patient Specific Goal (EDIT GOAL, INSERT TEXT)  Outcome: Progressing Towards Goal  Goal: *Patient Specific Goal (EDIT GOAL, INSERT TEXT)  Outcome: Progressing Towards Goal  Goal: Paranoid Ideation Interventions  Outcome: Progressing Towards Goal
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
## 2021-05-07 NOTE — BH NOTES
Pt in court room refusing to leave after redirection from staff and security. Pt states that he is Allah and doesn't have to do anything. Maykel Mac finally came out of court room on his own. He was offered PO PRN medication for agitation which he refused. Pt requested to go to his room. He was taken to room by wheelchair.

## 2021-05-07 NOTE — PROGRESS NOTES
Behavioral Services  Medicare Certification Upon Admission    I certify that this patient's inpatient psychiatric hospital admission is medically necessary for:    [x] (1) Treatment which could reasonably be expected to improve this patient's condition,       [x] (2) Or for diagnostic study;     AND     [x](2) The inpatient psychiatric services are provided while the individual is under the care of a physician and are included in the individualized plan of care.     Estimated length of stay/service 5-7 days    Plan for post-hospital care home    Electronically signed by Nataliia Noonan MD on 5/7/2021 at 7:22 PM
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
## 2021-05-07 NOTE — GROUP NOTE
NIMISHA  GROUP DOCUMENTATION INDIVIDUAL Group Therapy Note Date: 5/7/2021 Group Start Time: 1100 Group End Time: 1200 Group Topic: Topic Group [facility] 3 ACUTE BEHAV [facility], [address] [facility] GROUP DOCUMENTATION GROUP Group Therapy Note Attendees: 5 Attendance: Did not attend Patient's Goal: 
Good Ramos

## 2021-05-07 NOTE — GROUP NOTE
NIMISHA  GROUP DOCUMENTATION INDIVIDUAL Group Therapy Note Date: 5/7/2021 Group Start Time: 1500 Group End Time: 0052 Group Topic: Recreational/Music Therapy [address] 3 ACUTE BEHAV [facility], [address] GROUP DOCUMENTATION GROUP Group Therapy Note Attendees: 4 Attendance: Did not attend Patient's Goal: Interventions/techniques: 
Good Ramos

## 2021-05-07 NOTE — BH NOTES
GROUP THERAPY PROGRESS NOTE    Patient did not participate in Discharge Planning Group.      Moreno Constantino MSW

## 2021-05-07 NOTE — BH NOTES
GROUP THERAPY PROGRESS NOTE    Patient did not participate in Coping Skills Group.      Sherie Buerger, MSW

## 2021-05-07 NOTE — PROGRESS NOTES
[facility] Admission Pharmacy Medication Reconciliation     Information obtained from: [facility] from [address]: No    Comments/recommendations:  Per records sent by [address], patient's medication regimen was changed earlier this year after he was TDO'd to [place]. He has not received a fluphenazine decanoate injection in several months. [facility] PACT notes that patient was stable for ~1 year after discharge from [facility] (May-2019) on the below medication regimen:   Olanzapine 20 mg BID  Lithium carbonate 300 mg capsule: 2 caps (600 mg) in the morning and 3 caps (900 mg) at bedtime  Diphenhydramine 50 mg at bedtime    Medication changes (since last review): Added  Benztropine  Fluphenazine tablets  Fluphenazine decanoate  Removed  Divalproex ER  Haloperidol      [facility] reflects medications filled and processed through the patient's insurance, however                this data does NOT capture whether the medication was picked up or is currently being taken by the patient. Total Time Spent: 10 minutes    Past Medical History/Disease States:  Past Medical History:   Diagnosis Date    Aggressive outburst     Marijuana abuse 2010    Psychiatric disorder     schizophrenia    Psychotic disorder          Patient allergies: Allergies as of 2021    (No Known Allergies)         Prior to Admission Medications   Prescriptions Last Dose Informant Patient Reported? Taking?   benztropine (COGENTIN) 1 mg tablet Unknown at Unknown time  Yes No   Sig: Take 1 mg by mouth daily. fluPHENAZine (PROLIXIN) 10 mg tablet Unknown at Unknown time  Yes No   Sig: Take 10 mg by mouth two (2) times a day. fluPHENAZine decanoate (PROLIXIN) 25 mg/mL injection \"several months ago\" per PACT at Unknown time  Yes No   Si mg by IntraMUSCular route Once every 2 weeks.       Facility-Administered Medications: None          Thank you,  Luis Eduardo Salomon, PHARMD, [facility], [address] [place] [address]  Desk: [phone] ([phone])  Pharmacy: [phone] ([id number])

## 2021-05-07 NOTE — BH NOTES
[employer] conducted a virtual TDO Hearing this morning. The ending result of hearing, patient Committed.

## 2021-05-08 PROCEDURE — 74011250637 HC RX REV CODE- 250/637: Performed by: PSYCHIATRY & NEUROLOGY

## 2021-05-08 PROCEDURE — 74011250637 HC RX REV CODE- 250/637: Performed by: NURSE PRACTITIONER

## 2021-05-08 PROCEDURE — 65220000003 HC RM SEMIPRIVATE PSYCH

## 2021-05-08 RX ADMIN — OLANZAPINE 10 MG: 5 TABLET, ORALLY DISINTEGRATING ORAL at 21:21

## 2021-05-08 RX ADMIN — DIPHENHYDRAMINE HYDROCHLORIDE 50 MG: 25 CAPSULE ORAL at 21:21

## 2021-05-08 RX ADMIN — FLUPHENAZINE HYDROCHLORIDE 5 MG: 5 TABLET, FILM COATED ORAL at 08:00

## 2021-05-08 RX ADMIN — TRAZODONE HYDROCHLORIDE 50 MG: 50 TABLET ORAL at 21:21

## 2021-05-08 RX ADMIN — LITHIUM CARBONATE 900 MG: 450 TABLET, EXTENDED RELEASE ORAL at 21:21

## 2021-05-08 RX ADMIN — LITHIUM CARBONATE 600 MG: 300 TABLET, FILM COATED, EXTENDED RELEASE ORAL at 08:48

## 2021-05-08 NOTE — PROGRESS NOTES
Problem: Paranoid Ideation  Goal: *LTG: Show more trust in others by speaking positively of them & reporting comfort in socializing  Outcome: Progressing Towards Goal  Goal: *LTG: Interact with others without defensiveness or anger  Outcome: Progressing Towards Goal  Goal: *LTG: Verbalize trust of significant other & eliminate accusations of disloyalty  Outcome: Progressing Towards Goal  Goal: *LTG: Report reduced vigilance & suspicion around others as well as more relaxed, trusting, & open interaction  Outcome: Progressing Towards Goal  Goal: *STG: Identify core belief that others are untrustworthy & malicious  Outcome: Progressing Towards Goal  Goal: *STG: Make verbal connection between fears of others & own feelings of inadequacy  Outcome: Progressing Towards Goal  Goal: *STG: Agree to collaborate with therapist in probing feelings of vulnerability  Outcome: Progressing Towards Goal  Goal: *STG: Identify historical sources of feelings of vulnerability  Outcome: Progressing Towards Goal  Goal: *STG: Acknowledge that belief about others being threatening is based on more subjective interpretation than on objective data  Outcome: Progressing Towards Goal  Goal: *STG: Verbalize trust of significant other & feel relaxed when not in his/her presence  Outcome: Progressing Towards Goal  Goal: *STG: Stop being accusatory of others  Outcome: Progressing Towards Goal  Goal: *STG:Report interacting socially without fear or suspicion  Outcome: Progressing Towards Goal  Goal: *Patient Specific Goal (EDIT GOAL, INSERT TEXT)  Outcome: Progressing Towards Goal  Goal: *Patient Specific Goal (EDIT GOAL, INSERT TEXT)  Outcome: Progressing Towards Goal  Goal: Paranoid Ideation Interventions  Outcome: Progressing Towards Goal    Patient alert and oriented X4. He has been withdrawn to himself this evening. Patient denied depression, anxiety, SI/HI, AVH, and pain at this time. He is irritable at times.  Patient has been medication compliant. He refused PM vital sings. PRN Trazodone 50mg for sleep given at 2209. Q15 minute checks continue to maintain safety. RN will continue to monitor.

## 2021-05-08 NOTE — PROGRESS NOTES
Problem: Paranoid Ideation  Goal: *LTG: Show more trust in others by speaking positively of them & reporting comfort in socializing  Outcome: Not Progressing Towards Goal  Goal: *LTG: Interact with others without defensiveness or anger  Outcome: Not Progressing Towards Goal

## 2021-05-08 NOTE — PROGRESS NOTES
Patient rested 8 hours during the night. He refused AM labs. No distress noted. Will continue to monitor.

## 2021-05-08 NOTE — BH NOTES
GROUP THERAPY PROGRESS NOTE    Patient did not participate in recreational therapy/coping skills group.      Sherie Buerger, MSW

## 2021-05-08 NOTE — BH NOTES
Currently patient is alert and oriented x 3. HE is calm and cooperative. There are no current noted issues. Pt denies SI/HI, AVH, depression and anxiety. There are no noted behavioral issues at this time. Pt is meal and medication compliant. There are no other issues to note at this time.

## 2021-05-08 NOTE — BH NOTES
Psychiatric Progress Note  Weekend coverage  Patient: Robert Lindsay MRN: [id number]  SSN: [id number]    YOB: 1975  Age: 41 y.o. Sex: male      Admit Date: 5/6/2021    LOS: 2 days     Subjective:     Robert Lindsay  reports feeling fine and moods are good. Called his interviewer yesterday his enemy. Denies SI/HI/AH/VH. No aggression or violence. Appropriately interactive and aware. Tolerating medications well. Eating well and sleeping alright.     Objective:     Vitals:    05/06/21 2130 05/07/21 2130   BP: (!) 125/54    Pulse: 86    Resp: 18 18   Temp: 98.2 °F (36.8 °C)    SpO2: 100%    Weight: 77.1 kg (170 lb)    Height: 6' 2\" (1.88 m)         Mental Status Exam:   Sensorium  oriented to time, place and person   Relations cooperative   Eye Contact appropriate   Appearance:  age appropriate   Speech:  normal volume and non-pressured   Thought Process: goal directed and illogical   Thought Content free of hallucinations and internally preoccupied    Suicidal ideations none   Mood:  euthymic   Affect:  constricted   Memory   adequate   Concentration:  impaired   Insight:  impaired due to condition   Judgment:  fair       MEDICATIONS:  Current Facility-Administered Medications   Medication Dose Route Frequency    OLANZapine (ZyPREXA zydis) disintegrating tablet 10 mg  10 mg Oral QHS    lithium carbonate SR (LITHOBID) tablet 600 mg  600 mg Oral DAILY    lithium carbonate CR (ESKALITH CR) tablet 900 mg  900 mg Oral QHS    diphenhydrAMINE (BENADRYL) capsule 50 mg  50 mg Oral QHS    fluPHENAZine (PROLIXIN) tablet 5 mg  5 mg Oral DAILY    OLANZapine (ZyPREXA) tablet 5 mg  5 mg Oral Q6H PRN    haloperidol lactate (HALDOL) injection 5 mg  5 mg IntraMUSCular Q6H PRN    benztropine (COGENTIN) tablet 1 mg  1 mg Oral BID PRN    diphenhydrAMINE (BENADRYL) injection 50 mg  50 mg IntraMUSCular BID PRN    hydrOXYzine HCL (ATARAX) tablet 50 mg  50 mg Oral TID PRN    LORazepam (ATIVAN) injection 1 mg  1 mg IntraMUSCular Q4H PRN    traZODone (DESYREL) tablet 50 mg  50 mg Oral QHS PRN    acetaminophen (TYLENOL) tablet 650 mg  650 mg Oral Q4H PRN    magnesium hydroxide (MILK OF MAGNESIA) 400 mg/5 mL oral suspension 30 mL  30 mL Oral DAILY PRN      DISCUSSION:   the risks and benefits of the proposed medication  patient given opportunity to ask questions    Lab/Data Review: All lab results for the last 24 hours reviewed. No results found for this or any previous visit (from the past 24 hour(s)). Assessment:     Principal Problem:    Schizoaffective disorder, bipolar type ([facility] (5/7/2021)    Active Problems:    Tobacco dependence (9/12/2014)      Coc[facility] disorder, mild, abuse [facility] (5/7/2021)        Plan:     Continue current care  Collateral information  Medication modification as appropriate  Disposition planning with social work    I certify that this patient's inpatient psychiatric hospital services furnished since the previous certification were, and continue to be, required for treatment that could reasonably be expected to improve the patient's condition, or for diagnostic study, and that the patient continues to need, on a daily basis, active treatment furnished directly by or requiring the supervision of inpatient psychiatric facility personnel. In addition, the hospital records show that services furnished were intensive treatment services, admission or related services, or equivalent services.   Signed By: Bob Lao MD     May 8, 2021

## 2021-05-09 PROCEDURE — 65220000003 HC RM SEMIPRIVATE PSYCH

## 2021-05-09 PROCEDURE — 74011250637 HC RX REV CODE- 250/637: Performed by: PSYCHIATRY & NEUROLOGY

## 2021-05-09 RX ADMIN — LITHIUM CARBONATE 900 MG: 450 TABLET, EXTENDED RELEASE ORAL at 22:39

## 2021-05-09 RX ADMIN — LITHIUM CARBONATE 600 MG: 300 TABLET, FILM COATED, EXTENDED RELEASE ORAL at 09:54

## 2021-05-09 RX ADMIN — FLUPHENAZINE HYDROCHLORIDE 5 MG: 5 TABLET, FILM COATED ORAL at 09:54

## 2021-05-09 RX ADMIN — DIPHENHYDRAMINE HYDROCHLORIDE 50 MG: 25 CAPSULE ORAL at 22:39

## 2021-05-09 RX ADMIN — OLANZAPINE 10 MG: 5 TABLET, ORALLY DISINTEGRATING ORAL at 22:39

## 2021-05-09 NOTE — BH NOTES
GROUP THERAPY PROGRESS NOTE    Patient did not participate in Recreational Therapy/Coping Skills Group.     BINDU Matamoros

## 2021-05-09 NOTE — PROGRESS NOTES
Patient rested hours during the night. He refused AM labs. No distress noted. Will continue to monitor.

## 2021-05-09 NOTE — PROGRESS NOTES
Problem: Patient Education: Go to Patient Education Activity  Goal: Patient/Family Education  Outcome: Progressing Towards Goal     Problem: Falls - Risk of  Goal: *Absence of Falls  Description: Document Bravo Mclean Fall Risk and appropriate interventions in the flowsheet. Outcome: Progressing Towards Goal  Note: Fall Risk Interventions:  Mobility Interventions: Utilize walker, cane, or other assistive device    Mentation Interventions: Reorient patient       0930 Pt received in room. Pt refused assessment questions and RN from taking vital signs. Pt is however med and meal compliant. Will continue to monitor for any changes.

## 2021-05-09 NOTE — PROGRESS NOTES
Problem: Paranoid Ideation  Goal: *LTG: Show more trust in others by speaking positively of them & reporting comfort in socializing  Outcome: Progressing Towards Goal  Goal: *LTG: Interact with others without defensiveness or anger  Outcome: Progressing Towards Goal  Goal: *LTG: Verbalize trust of significant other & eliminate accusations of disloyalty  Outcome: Progressing Towards Goal  Goal: *LTG: Report reduced vigilance & suspicion around others as well as more relaxed, trusting, & open interaction  Outcome: Progressing Towards Goal  Goal: *STG: Identify core belief that others are untrustworthy & malicious  Outcome: Progressing Towards Goal  Goal: *STG: Make verbal connection between fears of others & own feelings of inadequacy  Outcome: Progressing Towards Goal  Goal: *STG: Agree to collaborate with therapist in probing feelings of vulnerability  Outcome: Progressing Towards Goal  Goal: *STG: Identify historical sources of feelings of vulnerability  Outcome: Progressing Towards Goal  Goal: *STG: Acknowledge that belief about others being threatening is based on more subjective interpretation than on objective data  Outcome: Progressing Towards Goal  Goal: *STG: Verbalize trust of significant other & feel relaxed when not in his/her presence  Outcome: Progressing Towards Goal  Goal: *STG: Stop being accusatory of others  Outcome: Progressing Towards Goal  Goal: *STG:Report interacting socially without fear or suspicion  Outcome: Progressing Towards Goal  Goal: *Patient Specific Goal (EDIT GOAL, INSERT TEXT)  Outcome: Progressing Towards Goal  Goal: *Patient Specific Goal (EDIT GOAL, INSERT TEXT)  Outcome: Progressing Towards Goal  Goal: Paranoid Ideation Interventions  Outcome: Progressing Towards Goal    Patient alert and oriented X4. He has been withdrawn to himself this evening. Patient denied depression, anxiety, SI/HI, AVH, and pain at this time. Patient has been medication compliant.  PRN Trazodone 50mg for sleep given at 2121. Vital signs stable and Q15 minute checks continue to maintain safety. RN will continue to monitor.

## 2021-05-10 PROCEDURE — 74011250637 HC RX REV CODE- 250/637: Performed by: PSYCHIATRY & NEUROLOGY

## 2021-05-10 PROCEDURE — 65220000003 HC RM SEMIPRIVATE PSYCH

## 2021-05-10 PROCEDURE — 99232 SBSQ HOSP IP/OBS MODERATE 35: CPT | Performed by: PSYCHIATRY & NEUROLOGY

## 2021-05-10 RX ORDER — FLUPHENAZINE DECANOATE 25 MG/ML
25 INJECTION, SOLUTION INTRAMUSCULAR; SUBCUTANEOUS EVERY 2 WEEKS
Status: DISCONTINUED | OUTPATIENT
Start: 2021-05-11 | End: 2021-05-11 | Stop reason: HOSPADM

## 2021-05-10 RX ADMIN — LITHIUM CARBONATE 600 MG: 300 TABLET, FILM COATED, EXTENDED RELEASE ORAL at 08:16

## 2021-05-10 RX ADMIN — FLUPHENAZINE HYDROCHLORIDE 5 MG: 5 TABLET, FILM COATED ORAL at 08:16

## 2021-05-10 RX ADMIN — LITHIUM CARBONATE 900 MG: 450 TABLET, EXTENDED RELEASE ORAL at 21:29

## 2021-05-10 RX ADMIN — OLANZAPINE 10 MG: 5 TABLET, ORALLY DISINTEGRATING ORAL at 21:28

## 2021-05-10 RX ADMIN — DIPHENHYDRAMINE HYDROCHLORIDE 50 MG: 25 CAPSULE ORAL at 21:28

## 2021-05-10 NOTE — BH NOTES
GROUP THERAPY PROGRESS NOTE    Patient did not participate in Discharge Planning Group.      Tristin Meek LPC [facility]ATP [facility]

## 2021-05-10 NOTE — GROUP NOTE
NIMISHA  GROUP DOCUMENTATION INDIVIDUAL Group Therapy Note Date: 5/10/2021 Group Start Time: 1100 Group End Time: 1200 Group Topic: Topic Group [facility] 3 ACUTE BEHAV [facility], [address] [facility] GROUP DOCUMENTATION GROUP Group Therapy Note Attendees: 5 Attendance: Did not attend Patient's Goal: Interventions/techniques:  
Jamar Austin

## 2021-05-10 NOTE — PROGRESS NOTES
Problem: Paranoid Ideation  Goal: *STG: Stop being accusatory of others  Outcome: Progressing Towards Goal

## 2021-05-10 NOTE — BH NOTES
0700- Received report from   0830- Pt is alert/oriented x4. He remains psychotic but progress is noted. Calm and Cooperative. Appropriate and visible in the milieu. Not attending groups. Mood is good. Meds/meal compliant. No behavioral issues. Denies suicidal and homicidal ideations. Denies auditory and visual hallucinations. Will continue to monitor pt with q15 min rounds for safety. 1430- Pt attending group. No behavioral issues at this time. He has made a lot of progress in behavioral and mental issues. He is M/M compliant. 1730- Pt had uneventful day. No concerns at this time. No PRN's given this shift.

## 2021-05-10 NOTE — BH NOTES
Psychiatric Progress Note  Weekend coverage  Patient: Arlin Bird MRN: [id number]  SSN: [id number]    YOB: 1975  Age: 41 y.o. Sex: male      Admit Date: 5/6/2021    LOS: 4 days     Subjective:     Arlin Bird  reports feeling fine and moods are good. Called his interviewer yesterday his enemy. Denies SI/HI/AH/VH. No aggression or violence. Appropriately interactive and aware. Tolerating medications well. Eating well and sleeping alright.    5/09 - Arlin Bird reports feeling calm detoxing from opiates. Moods are irritable. Denies SI/HI/AH/VH. No aggression or violence. Appropriately interactive and aware. Tolerating medications well. Eating and sleeping fairly. Wants to go home today or tomorrow. 5/10 - patient slept 9 hours overnight. He is less intrusive today though he remains delusional and religiously preoccupied. Patient encourarged to check in with his PACT team to discuss aftercare planning. He is ambivalent about getting injection, irritable and discharge focused but otherwise has been in fair behavioral control. No PRNs given for agitation overnight. Per SW, the patient does not have the 'god' delusion at baseline. Patient states he needs to leave the hospital at once to pay his rent which is past due.       Objective:     Vitals:    05/06/21 2130 05/07/21 2130 05/08/21 2130 05/10/21 0704   BP: (!) 125/54  126/62 108/77   Pulse: 86  82 63   Resp: 18 18 18 16   Temp:   98.6 °F (37 °C) 97.7 °F (36.5 °C)   SpO2: 100%  100% 100%   Weight: 77.1 kg (170 lb)      Height: 6' 2\" (1.88 m)           Mental Status Exam:   Sensorium  oriented to time, place and person   Relations cooperative   Eye Contact appropriate   Appearance:  age appropriate   Speech:  normal volume and pressured   Thought Process: disorganized   Thought Content Yazidism delusions and internally preoccupied    Suicidal ideations none   Mood:  euthymic   Affect:  constricted   Memory   adequate Concentration:  impaired   Insight:  impaired due to condition   Judgment:  fair       MEDICATIONS:  Current Facility-Administered Medications   Medication Dose Route Frequency    [START ON 5/11/2021] fluPHENAZine decanoate (PROLIXIN) 25 mg/mL injection 25 mg  25 mg IntraMUSCular EVERY 2 WEEKS    OLANZapine (ZyPREXA zydis) disintegrating tablet 10 mg  10 mg Oral QHS    lithium carbonate SR (LITHOBID) tablet 600 mg  600 mg Oral DAILY    lithium carbonate CR (ESKALITH CR) tablet 900 mg  900 mg Oral QHS    diphenhydrAMINE (BENADRYL) capsule 50 mg  50 mg Oral QHS    fluPHENAZine (PROLIXIN) tablet 5 mg  5 mg Oral DAILY    OLANZapine (ZyPREXA) tablet 5 mg  5 mg Oral Q6H PRN    haloperidol lactate (HALDOL) injection 5 mg  5 mg IntraMUSCular Q6H PRN    benztropine (COGENTIN) tablet 1 mg  1 mg Oral BID PRN    diphenhydrAMINE (BENADRYL) injection 50 mg  50 mg IntraMUSCular BID PRN    hydrOXYzine HCL (ATARAX) tablet 50 mg  50 mg Oral TID PRN    LORazepam (ATIVAN) injection 1 mg  1 mg IntraMUSCular Q4H PRN    traZODone (DESYREL) tablet 50 mg  50 mg Oral QHS PRN    acetaminophen (TYLENOL) tablet 650 mg  650 mg Oral Q4H PRN    magnesium hydroxide (MILK OF MAGNESIA) 400 mg/5 mL oral suspension 30 mL  30 mL Oral DAILY PRN      DISCUSSION:   the risks and benefits of the proposed medication  patient given opportunity to ask questions    Lab/Data Review: All lab results for the last 24 hours reviewed. No results found for this or any previous visit (from the past 24 hour(s)). Assessment:     Principal Problem:    Schizoaffective disorder, bipolar type ([facility] (5/7/2021)    Active Problems:    Tobacco dependence (9/12/2014)      Cocaine use disorder, mild, abuse ([facility] (5/7/2021)    Schizoaffective disorder    Assessment: patient disorganized, unable to reality test and with worsening delusional process in the setting of non-compliance and stimulant abuse. Will start antipsychotic agent, mood stabilizer.  Per outpatient team, patient has been difficult to find in the community. Will start adjunctive antipsychotic for GRULLON initiation. Plan:  - CONTINUE Zydis 10 mg QHS for psychosis  -  Prolixin 5 mg QDAY for psychosis adjunct  - CONTINUE Benadryl 50 mg QHS for insomnia, lability  - CONTINUE Lithium 600 mg QDAY and 900 mg QHS for mood lability  - START Prolixin 25 mg Q2Wks for psychosis GRULLON  - Li level 21  - IGM therapy as tolerated  - Expand database / obtain collateral  - Dispo planning     Patient is a very slow responder to medications. Risks and benefits in the use of two antipsychotics have been weighed in full, including the risk of metabolic syndrome and the potential increased risk of QTc  Based on this analysis, it is considered favorable for the utilization of two antipsychotics. In the future, once stable on the two antipsychotics, patient can possibly be tapered to one of the chosen antipsychotics. Will check on EKG results once stable to monitor QTc.     I will continue to monitor blood levels (lithium---a drug with a narrow therapeutic index= NTI) and associated labs for drug therapy implemented that require intense monitoring for toxicity as deemed appropriate based on current medication side effects.     Signed By: Mimi Hoffman MD     May 10, 2021

## 2021-05-10 NOTE — BH NOTES
Psychiatric Progress Note  Weekend coverage  Patient: Mathew Jaffe MRN: [id number]  SSN: [id number]    YOB: 1975  Age: 41 y.o. Sex: male      Admit Date: 5/6/2021    LOS: 3 days     Subjective:     Mathew Jaffe  reports feeling fine and moods are good. Called his interviewer yesterday his enemy. Denies SI/HI/AH/VH. No aggression or violence. Appropriately interactive and aware. Tolerating medications well. Eating well and sleeping alright.    5/09 - Mathew Jaffe reports feeling calm detoxing from opiates. Moods are irritable. Denies SI/HI/AH/VH. No aggression or violence. Appropriately interactive and aware. Tolerating medications well. Eating and sleeping fairly. Wants to go home today or tomorrow.       Objective:     Vitals:    05/06/21 2130 05/07/21 2130 05/08/21 2130   BP: (!) 125/54  126/62   Pulse: 86  82   Resp: 18 18 18   Temp:   98.6 °F (37 °C)   SpO2: 100%  100%   Weight: 77.1 kg (170 lb)     Height: 6' 2\" (1.88 m)          Mental Status Exam:   Sensorium  oriented to time, place and person   Relations cooperative   Eye Contact appropriate   Appearance:  age appropriate   Speech:  normal volume and non-pressured   Thought Process: goal directed and illogical   Thought Content free of hallucinations and internally preoccupied    Suicidal ideations none   Mood:  euthymic   Affect:  constricted   Memory   adequate   Concentration:  impaired   Insight:  impaired due to condition   Judgment:  fair       MEDICATIONS:  Current Facility-Administered Medications   Medication Dose Route Frequency    OLANZapine (ZyPREXA zydis) disintegrating tablet 10 mg  10 mg Oral QHS    lithium carbonate SR (LITHOBID) tablet 600 mg  600 mg Oral DAILY    lithium carbonate CR (ESKALITH CR) tablet 900 mg  900 mg Oral QHS    diphenhydrAMINE (BENADRYL) capsule 50 mg  50 mg Oral QHS    fluPHENAZine (PROLIXIN) tablet 5 mg  5 mg Oral DAILY    OLANZapine (ZyPREXA) tablet 5 mg  5 mg Oral Q6H PRN    haloperidol lactate (HALDOL) injection 5 mg  5 mg IntraMUSCular Q6H PRN    benztropine (COGENTIN) tablet 1 mg  1 mg Oral BID PRN    diphenhydrAMINE (BENADRYL) injection 50 mg  50 mg IntraMUSCular BID PRN    hydrOXYzine HCL (ATARAX) tablet 50 mg  50 mg Oral TID PRN    LORazepam (ATIVAN) injection 1 mg  1 mg IntraMUSCular Q4H PRN    traZODone (DESYREL) tablet 50 mg  50 mg Oral QHS PRN    acetaminophen (TYLENOL) tablet 650 mg  650 mg Oral Q4H PRN    magnesium hydroxide (MILK OF MAGNESIA) 400 mg/5 mL oral suspension 30 mL  30 mL Oral DAILY PRN      DISCUSSION:   the risks and benefits of the proposed medication  patient given opportunity to ask questions    Lab/Data Review: All lab results for the last 24 hours reviewed. No results found for this or any previous visit (from the past 24 hour(s)). Assessment:     Principal Problem:    [facility] (5/7/2021)    Active Problems:    Tobacco dependence (9/12/2014)      Cocaine use disorder, mild, abuse ([facility] (5/7/2021)        Plan:     Continue current care  Collateral information  Medication modification as appropriate  Disposition planning with social work    I certify that this patient's inpatient psychiatric hospital services furnished since the previous certification were, and continue to be, required for treatment that could reasonably be expected to improve the patient's condition, or for diagnostic study, and that the patient continues to need, on a daily basis, active treatment furnished directly by or requiring the supervision of inpatient psychiatric facility personnel. In addition, the hospital records show that services furnished were intensive treatment services, admission or related services, or equivalent services.   Signed By: Checo Chandra MD     May 9, 2021

## 2021-05-10 NOTE — PROGRESS NOTES
A&O x4. Irritated staff woke him up to have VS taken. Pt irritable answering assessment questions. Answered in one word answers. Denies SI/HI/AH/VH. Currently not wanting to eat snack or participate w/ peers in day room. Isolated to room all shift. Slept 9 hrs.

## 2021-05-10 NOTE — BH NOTES
GROUP THERAPY PROGRESS NOTE    Patient is participating in Process group. Group time: 45 minutes    Personal goal for participation: To discussion stressful situations patients are currently or recently experiencing and ways to cope. Goal orientation: Personal    Group therapy participation: active    Therapeutic interventions reviewed and discussed: Group discussion was focused on the stressful situations that patients have experienced since admission or just prior. Patients were able to talk about ways they observe stress in their bodies and in the ways they interact with others. Patients were able to share their feelings and emotions and get feedback from others. Impression of participation: Alberto Rosibel was present in group. He shared he had a bad day because his treatment team would not discharge him today. He shared that he felt he was ready to go and that he has a plan. He was reminded that although staying here may not be ideal it is a rainy day today and chillier than the last couple days. He reports this makes not leaving a little better because he does not have to deal with the rain. He was pleasant and appropriate in group. After the other group members left for various activities with staff, he reported he was going to gather his belongings in the day room so that this writer could leave early today.      Bertha Ramirez LPC [facility]

## 2021-05-10 NOTE — BH NOTES
Behavioral Health Treatment Team Note     Patient goal(s) for today: Take medications as prescribed. Treatment team focus/goals: Medication adjustments. Progress note irritable, disorganized, discharge focused. SW updated pt's PACT worker with plan for discharge tomorrow. LOS:  4  Expected LOS:     Financial concerns/prescription coverage:  [facility]  Date of last family contact: None     Family requesting physician contact today:  N/A  Discharge plan: Home  Guns in the home: None involved.        Outpatient provider(s): [address] [place] [address] PACT    Participating treatment team members: Zac Mcnair MSW and Dr. Ysabel Bentley MD

## 2021-05-10 NOTE — GROUP NOTE
NIMISHA  GROUP DOCUMENTATION INDIVIDUAL Group Therapy Note Date: 5/10/2021 Group Start Time: 1500 Group End Time: 0292 Group Topic: Recreational/Music Therapy [address] ACUTE BEHAV [facility] [place], [address] GROUP DOCUMENTATION GROUP Group Therapy Note Attendees: 3 Attendance: Did not attend Patient's Goal: Interventions/techniques Alma Nobles

## 2021-05-10 NOTE — SUICIDE SAFETY PLAN
SAFETY PLAN    A suicide Safety Plan is a document that supports someone when they are having thoughts of suicide. Warning Signs that indicate a suicidal crisis may be developing: What (situations, thoughts, feelings, body sensations, behaviors, etc.) do you experience that lets you know you are beginning to think about suicide? 1. Others don't believe me  2. Around other people with poor mental health  3. Internal Coping Strategies:  What things can I do (relaxation techniques, hobbies, physical activities, etc.) to take my mind off my problems without contacting another person? 1. Resting   2. chess  3. Video games  4. TV    People and social settings that provide distraction: Who can I call or where can I go to distract me? 1. Name: \"No one\"  Phone:   2. Name:  Phone:   3. Place: beach         4. Place:     People whom I can ask for help: Who can I call when I need help - for example, friends, family, clergy, someone else? 1. Name: \"No one\"            Phone:   2. Name:   Phone:   3. Name:  Phone:     Professionals or [address] [place] [address] I can contact during a crisis: Who can I call for help - for example, my doctor, my psychiatrist, my psychologist, a mental health provider, a suicide hotline? 1. Clinician Name: Odean Lefort, PACT Team worker Phone: [phone]      Clinician Pager or Emergency Contact #: [phone]    4. Clinician Name:    Phone:       Clinician Pager or Emergency Contact #:     3. Suicide Prevention Lifeline: 6-151-740-TALK (6976)    4. [address] [place] [address] Emergency Services -  for example, [facility]: [facility]      Emergency Services Address: [address], [address] [facility]      Emergency Services Phone: [phone]    Making the environment safe: How can I make my environment (house/apartment/living space) safer? For example, can I remove guns, medications, and other items? 1.  No guns or weapons in home or posession  2.  No safety concerns

## 2021-05-10 NOTE — GROUP NOTE
NIMISHA  GROUP DOCUMENTATION INDIVIDUAL Group Therapy Note Date: 5/10/2021 Group Start Time: 0900 Group End Time: 1000 Group Topic: Topic Group [facility] 3 ACUTE BEHAV [facility], [address] GROUP DOCUMENTATION GROUP Group Therapy Note Attendees: 4 Attendance: Did not attend Patient's Goal: 
Jamar Austin

## 2021-05-11 VITALS
HEART RATE: 82 BPM | WEIGHT: 170 LBS | DIASTOLIC BLOOD PRESSURE: 76 MMHG | OXYGEN SATURATION: 99 % | TEMPERATURE: 97.7 F | SYSTOLIC BLOOD PRESSURE: 135 MMHG | HEIGHT: 74 IN | BODY MASS INDEX: 21.82 KG/M2 | RESPIRATION RATE: 18 BRPM

## 2021-05-11 LAB
ANION GAP SERPL CALC-SCNC: 10 MMOL/L (ref 5–15)
BUN SERPL-MCNC: 8 MG/DL (ref 6–20)
BUN/CREAT SERPL: 10 (ref 12–20)
CALCIUM SERPL-MCNC: 8.6 MG/DL (ref 8.5–10.1)
CHLORIDE SERPL-SCNC: 107 MMOL/L (ref 97–108)
CHOLEST SERPL-MCNC: 161 MG/DL
CO2 SERPL-SCNC: 26 MMOL/L (ref 21–32)
CREAT SERPL-MCNC: 0.77 MG/DL (ref 0.7–1.3)
DATE LAST DOSE: ABNORMAL
GLUCOSE SERPL-MCNC: 86 MG/DL (ref 65–100)
HDLC SERPL-MCNC: 55 MG/DL
HDLC SERPL: 2.9 {RATIO} (ref 0–5)
LDLC SERPL CALC-MCNC: 89.4 MG/DL (ref 0–100)
LIPID PROFILE,FLP: NORMAL
LITHIUM SERPL-SCNC: 0.37 MMOL/L (ref 0.6–1.2)
POTASSIUM SERPL-SCNC: 4.5 MMOL/L (ref 3.5–5.1)
REPORTED DOSE,DOSE: ABNORMAL UNITS
REPORTED DOSE/TIME,TMG: ABNORMAL
SODIUM SERPL-SCNC: 143 MMOL/L (ref 136–145)
TRIGL SERPL-MCNC: 83 MG/DL (ref ?–150)
TSH SERPL DL<=0.05 MIU/L-ACNC: 1.01 UIU/ML (ref 0.36–3.74)
VLDLC SERPL CALC-MCNC: 16.6 MG/DL

## 2021-05-11 PROCEDURE — 83036 HEMOGLOBIN GLYCOSYLATED A1C: CPT

## 2021-05-11 PROCEDURE — 99239 HOSP IP/OBS DSCHRG MGMT >30: CPT | Performed by: PSYCHIATRY & NEUROLOGY

## 2021-05-11 PROCEDURE — 80048 BASIC METABOLIC PNL TOTAL CA: CPT

## 2021-05-11 PROCEDURE — 36415 COLL VENOUS BLD VENIPUNCTURE: CPT

## 2021-05-11 PROCEDURE — 93005 ELECTROCARDIOGRAM TRACING: CPT

## 2021-05-11 PROCEDURE — 74011250637 HC RX REV CODE- 250/637: Performed by: PSYCHIATRY & NEUROLOGY

## 2021-05-11 PROCEDURE — 74011250636 HC RX REV CODE- 250/636: Performed by: PSYCHIATRY & NEUROLOGY

## 2021-05-11 PROCEDURE — 80061 LIPID PANEL: CPT

## 2021-05-11 PROCEDURE — 84443 ASSAY THYROID STIM HORMONE: CPT

## 2021-05-11 PROCEDURE — 80178 ASSAY OF LITHIUM: CPT

## 2021-05-11 RX ORDER — OLANZAPINE 10 MG/1
10 TABLET, ORALLY DISINTEGRATING ORAL
Qty: 30 TAB | Refills: 1 | Status: ON HOLD | OUTPATIENT
Start: 2021-05-11 | End: 2021-06-08

## 2021-05-11 RX ORDER — FLUPHENAZINE HYDROCHLORIDE 5 MG/1
5 TABLET ORAL DAILY
Qty: 30 TAB | Refills: 1 | Status: ON HOLD | OUTPATIENT
Start: 2021-05-12 | End: 2021-06-08

## 2021-05-11 RX ORDER — DIPHENHYDRAMINE HCL 50 MG
50 CAPSULE ORAL
Qty: 30 CAP | Refills: 1 | Status: ON HOLD | OUTPATIENT
Start: 2021-05-11 | End: 2021-06-08

## 2021-05-11 RX ORDER — LITHIUM CARBONATE 450 MG/1
900 TABLET ORAL
Qty: 60 TAB | Refills: 1 | Status: ON HOLD | OUTPATIENT
Start: 2021-05-11 | End: 2021-06-08

## 2021-05-11 RX ORDER — LITHIUM CARBONATE 300 MG/1
600 TABLET, FILM COATED, EXTENDED RELEASE ORAL DAILY
Qty: 60 TAB | Refills: 1 | Status: ON HOLD | OUTPATIENT
Start: 2021-05-12 | End: 2021-06-08

## 2021-05-11 RX ORDER — FLUPHENAZINE DECANOATE 25 MG/ML
25 INJECTION, SOLUTION INTRAMUSCULAR; SUBCUTANEOUS EVERY 2 WEEKS
Qty: 1 VIAL | Refills: 0 | Status: ON HOLD | OUTPATIENT
Start: 2021-05-25 | End: 2021-06-08

## 2021-05-11 RX ADMIN — FLUPHENAZINE DECANOATE 25 MG: 25 INJECTION, SOLUTION INTRAMUSCULAR; SUBCUTANEOUS at 08:09

## 2021-05-11 RX ADMIN — LITHIUM CARBONATE 600 MG: 300 TABLET, FILM COATED, EXTENDED RELEASE ORAL at 08:06

## 2021-05-11 RX ADMIN — FLUPHENAZINE HYDROCHLORIDE 5 MG: 5 TABLET, FILM COATED ORAL at 08:05

## 2021-05-11 NOTE — DISCHARGE INSTRUCTIONS
Patient Education   If I feel I am at risk of hurting myself or others, I will call the crisis office and speak with a crisis worker who will assist me during my crisis. [address]  [phone]  [facility] [place] [address] [phone]  [address] [facility]-  [phone]  [facility]-  [phone]  [place] [facility]-  [phone]         Bipolar Disorder in Children: Care Instructions  Your Care Instructions     Bipolar disorder is sometimes called manic depression. It is an illness that causes extreme mood changes. Moods go from times of very high energy (manic episodes) to times of depression. These moods may cause problems with your child's schooling, family life, friendships, and ability to function. There is no cure for bipolar disorder. But it can be helped with medicines. Counseling may also help. It is important for your child to take his or her medicines exactly as prescribed, even when he or she feels well. Your child may need lifelong treatment. Follow-up care is a key part of your child's treatment and safety. Be sure to make and go to all appointments, and call your doctor if your child is having problems. It's also a good idea to know your child's test results and keep a list of the medicines your child takes. How can you care for your child at home? · Give your child medicines exactly as prescribed. Do not stop or change a medicine without talking to your doctor first. Your child may need to try different combinations of medicines to find what works best.  · Give your child's medicines on schedule to keep your child's moods even. When your child feels good, you may think that he or she does not need the medicines, but it is important that your child keeps taking them. · Make sure your child goes to his or her counseling sessions.  Call and talk with your child's counselor if your child cannot go to a session or does not think the sessions are helping. Do not just let your child stop going. · Have your child get at least 30 minutes of activity on most days of the week. Walking is a good choice. You also may want your child to do other things, such as running, swimming, or cycling. · Make sure your child gets enough sleep. Keep your child's room dark and quiet, and try to have your child go to bed at the same time every night. · Make sure your child eats a healthy diet. A healthy diet includes whole grains, dairy, fruits and vegetables, and protein. Have your child eat foods from each of these groups. · Try to lower your child's stress. Help manage your child's time and help him or her lead a healthy lifestyle. To lower your child's stress, have him or her try physical activity or slow deep breathing. · Make sure your child does not use alcohol or illegal drugs. · Learn the early signs of your child's mood changes so you can take steps to help your child feel better. · Encourage your child to ask for help from friends and family when he or she needs it. Your child may need help with daily chores when he or she is depressed. When your child is manic, he or she may need support to control high energy levels. What should you do if your child has bipolar disorder? · Learn about bipolar disorder and signs that the problem is getting worse. · Remind your child that you love him or her. · Make a plan with all family members about how to take care of your child when his or her symptoms are bad. · Talk about your fears and concerns and those of other family members. Seek counseling if needed. · Do not focus attention only on your child who is in treatment. · Remind yourself that it will take time for changes to occur. · Do not blame yourself for your child's condition. · Know your legal rights and the legal rights of your child. Support groups or counselors can help you with this information. · Take care of yourself.  Keep up with your own interests, such as your career, hobbies, and friends. Use exercise, positive self-talk, deep breathing, and other relaxing exercises to help lower your stress. · Give yourself time to grieve. You may need to deal with emotions such as anger, fear, and frustration. After you work through your feelings, you will be better able to care for yourself and your child. · If you are having a hard time with your feelings or with your relationship with your child, talk with a counselor. When should you call for help? Call 911 anytime you think your child may need emergency care. For example, call if:    · Your child feels like hurting himself or herself or someone else.     · Your child displays dangerous behavior, and you think your child might hurt himself or herself or someone else. Call your doctor now or seek immediate medical care if:    · Your child hears voices.     · Your child talks about suicide. Keep the numbers for these national suicide hotlines: 3-215-005-TALK (5-755.736.9845) and 0-321-YLTSMEG (7-729.422.1902). If a suicide threat seems real, with a specific plan and a way to carry it out, stay with your child, or ask someone you trust to stay with your child, until you can get help.     · Your child has bipolar disorder and:  ? Starts to give away possessions. ? Is using illegal drugs or drinking alcohol heavily. ? Talks or writes about death, including writing suicide notes or talking about guns, knives, or pills. ? Talks or writes about hurting someone else. ? Starts to spend a lot of time alone. ? Acts very aggressively or suddenly appears calm. ? Talks about beliefs that are not based in reality (delusions). Watch closely for changes in your child's health, and be sure to contact your doctor if:    · Your child cannot go to his or her counseling sessions. Where can you learn more?   Go to http://www.gray.com/  Enter N336 in the search box to learn more about \"Bipolar Disorder in Children: Care Instructions. \"  Current as of: September 23, 2020               Content Version: 12.8  © 2006-2021 [employer]. Care instructions adapted under license by [employer] (which disclaims liability or warranty for this information). If you have questions about a medical condition or this instruction, always ask your healthcare professional. [address] any warranty or liability for your use of this information. Patient Education        Learning About Schizoaffective Disorder  What is schizoaffective disorder? Schizoaffective (say \"ipzy-kw-tz-FECK-tiv\") disorder is a complex mental illness. People who have it have the symptoms of both schizophrenia and a mood disorder. The disorder affects how clearly you can think. It can also make it hard to manage your emotions and connect with others. And it affects how happy or sad you feel. What causes it? Experts don't know what causes schizoaffective disorder. It may have different causes for different people. It's not caused by anything you did or how your parents raised you. And it's not a sign of weakness. What are the symptoms? The symptoms of schizoaffective disorder are the same as those of schizophrenia and a mood disorder. People with schizoaffective disorder may have many of these symptoms. Schizophrenia symptoms include:  · Having hallucinations. This means that you see or hear things that aren't really there. · Having delusions. These are beliefs that aren't real.  · Having a hard time feeling and showing emotion. Mood disorder symptoms include:  · Depression. · Feeling extremely happy or having lots of energy. How is it diagnosed? Your doctor or mental health professional usually can tell if you have schizoaffective disorder by talking with you.  He or she will look at the order and timing of your symptoms and how long your symptoms last.  Your doctor will ask you about other things too. These may include questions about:  · Any odd experiences you may have had, such as hearing voices or having confusing thoughts. · Your feelings. · Any changes in eating habits, energy level, and interest in daily tasks. · How well you are sleeping. · If you can focus on the things you do. How is it treated? Finding out that you have schizoaffective disorder can be scary and hard to deal with. But the disorder can be treated. The goal of treatment is to lower your stress and help your brain work as it should. Ongoing treatment can keep the disorder under control. Treatment includes medicines and counseling. Medicines help your symptoms. It's important to take your medicines on schedule to keep your moods even. When you feel good, you may think that you don't need them. But it is important to keep taking them. Counseling helps you change how you think about things. It can also help you cope with the illness. You will work with a mental health professional. This may be a psychologist, a licensed professional counselor, a clinical , or a psychiatrist.  Follow-up care is a key part of your treatment and safety. Be sure to make and go to all appointments, and call your doctor if you are having problems. It's also a good idea to know your test results and keep a list of the medicines you take. Where can you learn more? Go to http://www.gray.com/  Enter A016 in the search box to learn more about \"Learning About Schizoaffective Disorder. \"  Current as of: September 23, 2020               Content Version: 12.8  © 2006-2021 [employer]. Care instructions adapted under license by [employer] (which disclaims liability or warranty for this information).  If you have questions about a medical condition or this instruction, always ask your healthcare professional. [place] [address] any warranty or liability for your use of this information.

## 2021-05-11 NOTE — DISCHARGE SUMMARY
PSYCHIATRIC DISCHARGE SUMMARY         IDENTIFICATION:    Patient Name  Ronald Thomson   Date of Birth 1975   [facility] [id number]   Medical Record Number  [id number]      Age  41 y.o. PCP None   Admit date:  5/6/2021    Discharge date: 5/11/2021   Room Number  617/31  @ [facility]   Date of Service  5/11/2021            TYPE OF DISCHARGE: REGULAR               CONDITION AT DISCHARGE: fair       PROVISIONAL & DISCHARGE DIAGNOSES:    Problem List  Never Reviewed          Codes Class    Cocaine use disorder, mild, abuse ([facility] ICD-10-CM: F14.10  ICD-9-CM: 305.60         * (Principal) Schizoaffective disorder, bipolar type ([facility] ICD-10-CM: F25.0  ICD-9-CM: 295.70         Tobacco dependence ICD-10-CM: F17.200  ICD-9-CM: 305.1               Active Hospital Problems    Cocaine use disorder, mild, abuse ([facility]      *Schizoaffective disorder, bipolar type ([facility]      Tobacco dependence        DISCHARGE DIAGNOSIS:   Axis I:  SEE ABOVE  Axis II: SEE ABOVE  Axis III: SEE ABOVE  Axis IV:  lack of structure  Axis V:  10 on admission, 55 on discharge     CC & HISTORY OF PRESENT ILLNESS:  \"Psychosis\"    The patient, Ronald Thomson, is a 41 y.o. BLACK/ male with a past psychiatric history significant for schizoaffective disorder and cocaine use disorder, who presents at this time with complaints of (and/or evidence of) the following emotional symptoms: agitation, delusions and hill. Additional symptomatology include poor self care. The above symptoms have been present for 2+ weeks. These symptoms are of moderate to high severity. These symptoms are constant in nature. The patient's condition has been precipitated by psychosocial stressors. Patient's condition made worse by continued illicit drug use as well as treatment noncompliance.  UDS: +cocaine; BAL=0.     Per admission documentation, patient is well known to PACT team, he has a lengthy history of hospitalizations, baseline delusional beliefs (he typically states he is 'God') and ongoing difficulties with compliance and substance abuse. He was found in a store after making terroristic threats and then declaring that other patrons were trespassing.      Patient seen in the milieu due to violence history. The patient is a poor historian. The patient corroborates the above narrative. The patient contracts for safety on the unit but does not give consent for the team to contact collateral. The patient is amenable to initiating treatment while on the unit.  The patient repeatedly describes himself as various forms of \"God,\" and is largely incoherent when asked about the events prior to admission.        SOCIAL HISTORY:    Social History     Socioeconomic History    Marital status: SINGLE     Spouse name: Not on file    Number of children: Not on file    Years of education: Not on file    Highest education level: Not on file   Occupational History    Not on file   Social Needs    Financial resource strain: Not on file    Food insecurity     Worry: Not on file     Inability: Not on file    Transportation needs     Medical: Not on file     Non-medical: Not on file   Tobacco Use    Smoking status: Current Every Day Smoker     Packs/day: 1.00     Years: 10.00     Pack years: 10.00    Smokeless tobacco: Never Used   Substance and Sexual Activity    Alcohol use: No    Drug use: Yes     Types: Marijuana, Heroin    Sexual activity: Not Currently   Lifestyle    Physical activity     Days per week: Not on file     Minutes per session: Not on file    Stress: Not on file   Relationships    Social connections     Talks on phone: Not on file     Gets together: Not on file     Attends Oriental orthodox service: Not on file     Active member of club or organization: Not on file     Attends meetings of clubs or organizations: Not on file     Relationship status: Not on file    Intimate partner violence     Fear of current or ex partner: Not on file Emotionally abused: Not on file     Physically abused: Not on file     Forced sexual activity: Not on file   Other Topics Concern    Not on file   Social History Narrative    40year old AA male admitted for psychosis. Pt has had multiple psychiatric admissions and has a history of non compliance with out patient treatment. Pt abuses THC. He is followed by [facility]. FAMILY HISTORY:   Family History   Problem Relation Age of Onset    Depression Neg Hx     Suicide Neg Hx     Psychotic Disorder Neg Hx     Substance Abuse Neg Hx     Dementia Neg Hx              HOSPITALIZATION COURSE:    Gilbert Fontana was admitted to the inpatient psychiatric unit [facility] for acute psychiatric stabilization in regards to symptomatology as described in the HPI above. The differential diagnosis at time of admission included: schizophrenia vs schizoaffective disorder. While on the unit Gilbert Fontana was involved in individual, group, occupational and milieu therapy. Psychiatric medications were adjusted during this hospitalization including Lithium, Zydis, and Prolixin, given as PO and as decanoate injection prior to discharge. Gilbert Fontana demonstrated a slow, but progressive improvement in overall condition. Much of patient's initial presentation appeared to be related to situational stressors, effects of medication non-compliance drugs of abuse, and psychological factors. Please see individual progress notes for more specific details regarding patient's hospitalization course. At time of discharge, Gilbert Fontana is without significant problems of depression, psychosis, or hill. Patient free of suicidal and homicidal ideations (appears to be at very low risk of suicide or homicide) and reports many positive predictive factors in terms of not attempting suicide or homicide.  Overall presentation at time of discharge is most consistent with the diagnosis of schizoaffective disorder and cocaine use disorder. Patient has maximized benefit to be derived from acute inpatient psychiatric treatment. All members of the treatment team concur with each other in regards to plans for discharge today. Patient and PACT team aware and in agreement with discharge and discharge plan.          LABS AND IMAGAING:    Labs Reviewed   LITHIUM - Abnormal; Notable for the following components:       Result Value    Lithium level 0.37 (*)     All other components within normal limits   HEMOGLOBIN A1C WITH EAG   LIPID PANEL   TSH 3RD GENERATION   CBC W/O DIFF   METABOLIC PANEL, BASIC     Lab Results   Component Value Date/Time    Valproic acid 68 05/16/2018 10:31 AM     Admission on 05/06/2021   Component Date Value Ref Range Status    LIPID PROFILE 05/11/2021        Final    Cholesterol, total 05/11/2021 161  <200 MG/DL Final    Triglyceride 05/11/2021 83  <150 MG/DL Final    HDL Cholesterol 05/11/2021 55  MG/DL Final    LDL, calculated 05/11/2021 89.4  0 - 100 MG/DL Final    VLDL, calculated 05/11/2021 16.6  MG/DL Final    CHOL/HDL Ratio 05/11/2021 2.9  0.0 - 5.0   Final    Lithium level 05/11/2021 0.37* 0.60 - 1.20 MMOL/L Final    Reported dose date 05/11/2021 NOT PROVIDED    Final    Reported dose time: 05/11/2021 NOT PROVIDED    Final    Reported dose: 05/11/2021 NOT PROVIDED  UNITS Final    TSH 05/11/2021 1.01  0.36 - 3.74 uIU/mL Final    Ventricular Rate 05/11/2021 77  BPM Preliminary    Atrial Rate 05/11/2021 77  BPM Preliminary    P-R Interval 05/11/2021 170  ms Preliminary    QRS Duration 05/11/2021 106  ms Preliminary    Q-T Interval 05/11/2021 372  ms Preliminary    QTC Calculation (Bezet) 05/11/2021 420  ms Preliminary    Calculated P Axis 05/11/2021 74  degrees Preliminary    Calculated R Axis 05/11/2021 69  degrees Preliminary    Calculated T Axis 05/11/2021 51  degrees Preliminary    Diagnosis 05/11/2021    Preliminary                    Value:Normal sinus rhythm  Possible Left atrial enlargement  Borderline ECG  No previous ECGs available       No results found. DISPOSITION:    Home. Patient to f/u with psychiatric appointments. Patient is to f/u with internist as directed. Patient should have a lithium level and associated labs checked within the next 6 months by patient's o/p psychiatrist/internist.               FOLLOW-UP CARE:    Activity as tolerated  Regular diet  Wound Care: none needed. Follow-up Information     Follow up With Specialties Details Why Contact Info    [facility]  Call today Please call your PACT worker Alta Lentz today and he will schedule your medication management appointment with Dr. Mireille Buchanan and team this week. [address] [place] [address]   Address: [address] [place] [address], [address] [place] [address]  Phone: [phone]  Fax: [phone]  Crisis number:  [phone]       None    None (395) Patient stated that they have no PCP                   PROGNOSIS:   Poor---- based on nature of patient's pathology/ies and treatment compliance issues. Prognosis is greatly dependent upon patient's ability to remain sober and to follow up with scheduled appointments as well as to comply with psychiatric medications as prescribed. DISCHARGE MEDICATIONS:    Informed consent given for the use of following psychotropic medications:  Current Discharge Medication List      START taking these medications    Details   diphenhydrAMINE (BENADRYL) 50 mg capsule Take 1 Cap by mouth nightly for 60 days. Indications: extrapyramidal symptoms as a result of taking the medication  Qty: 30 Cap, Refills: 1  Start date: 5/11/2021, End date: 7/10/2021      !! lithium carbonate CR (ESKALITH CR) 450 mg CR tablet Take 2 Tabs by mouth nightly. Indications: Schizoaffective disorder  Qty: 60 Tab, Refills: 1  Start date: 5/11/2021      !! lithium carbonate SR (LITHOBID) 300 mg CR tablet Take 2 Tabs by mouth daily.  Indications: Schizoaffective disorder  Qty: 60 Tab, Refills: 1  Start date: 5/12/2021      OLANZapine (ZyPREXA zydis) 10 mg disintegrating tablet Take 1 Tab by mouth nightly. Indications: Schizoaffective disorder  Qty: 30 Tab, Refills: 1  Start date: 5/11/2021       !! - Potential duplicate medications found. Please discuss with provider. CONTINUE these medications which have CHANGED    Details   fluPHENAZine decanoate (PROLIXIN) 25 mg/mL injection 1 mL by IntraMUSCular route Once every 2 weeks. Next due 5/25/2021  Indications: schizophrenia  Qty: 1 Vial, Refills: 0  Start date: 5/25/2021      fluPHENAZine (PROLIXIN) 5 mg tablet Take 1 Tab by mouth daily. Indications: schizophrenia  Qty: 27 Tab, Refills: 1  Start date: 5/12/2021         STOP taking these medications       benztropine (COGENTIN) 1 mg tablet Comments:   Reason for Stopping:                      A coordinated, multidisplinary treatment team round was conducted with Reymundo Jean---this is done daily here at [facility]. This team consists of the nurse, psychiatric unit pharmacist,  and writer. I have spent greater than 35 minutes on discharge work.     Signed:  Roverto Oliver MD  5/11/2021

## 2021-05-11 NOTE — BH NOTES
GROUP THERAPY PROGRESS NOTE    Patient did not participate in Substance abuse/Coping Skills group.      Juan Nieto LPC LSATP [facility]

## 2021-05-11 NOTE — GROUP NOTE
NIMISHA  GROUP DOCUMENTATION INDIVIDUAL Group Therapy Note Date: 5/11/2021 Group Start Time: 0900 Group End Time: 1000 Group Topic: Topic Group [facility] 3 ACUTE BEHAV [facility], [address] GROUP DOCUMENTATION GROUP Group Therapy Note Attendees: 5 Attendance: Did not attend Patient's Goal: Interventions/techniquesAlice Munson

## 2021-05-11 NOTE — GROUP NOTE
NIMISHA  GROUP DOCUMENTATION INDIVIDUAL Group Therapy Note Date: 5/11/2021 Group Start Time: 1100 Group End Time: 1200 Group Topic: Topic Group [facility] 3 ACUTE BEHAV [facility] [place], [address] [facility] GROUP DOCUMENTATION GROUP Group Therapy Note Attendees: 5 Attendance: Did not attend Patient's Goal: Interventions/techniquesAlice Munson

## 2021-05-11 NOTE — PROGRESS NOTES
Problem: Patient Education: Go to Patient Education Activity  Goal: Patient/Family Education  Outcome: Resolved/Met     Problem: Paranoid Ideation  Goal: *LTG: Show more trust in others by speaking positively of them & reporting comfort in socializing  Outcome: Resolved/Met  Goal: *LTG: Interact with others without defensiveness or anger  Outcome: Resolved/Met  Goal: *LTG: Verbalize trust of significant other & eliminate accusations of disloyalty  Outcome: Resolved/Met  Goal: *LTG: Report reduced vigilance & suspicion around others as well as more relaxed, trusting, & open interaction  Outcome: Resolved/Met  Goal: *STG: Identify core belief that others are untrustworthy & malicious  Outcome: Resolved/Met  Goal: *STG: Make verbal connection between fears of others & own feelings of inadequacy  Outcome: Resolved/Met  Goal: *STG: Agree to collaborate with therapist in probing feelings of vulnerability  Outcome: Resolved/Met  Goal: *STG: Identify historical sources of feelings of vulnerability  Outcome: Resolved/Met  Goal: *STG: Acknowledge that belief about others being threatening is based on more subjective interpretation than on objective data  Outcome: Resolved/Met  Goal: *STG: Verbalize trust of significant other & feel relaxed when not in his/her presence  Outcome: Resolved/Met  Goal: *STG: Stop being accusatory of others  5/11/2021 1331 by Dustin Mott RN  Outcome: Resolved/Met  5/11/2021 1125 by Dustin Mott RN  Outcome: Progressing Towards Goal  Goal: *STG:Report interacting socially without fear or suspicion  5/11/2021 1331 by Dustin Mott RN  Outcome: Resolved/Met  5/11/2021 1125 by Dustin Mott RN  Outcome: Progressing Towards Goal  Goal: *Patient Specific Goal (EDIT GOAL, INSERT TEXT)  Outcome: Resolved/Met  Goal: *Patient Specific Goal (EDIT GOAL, INSERT TEXT)  Outcome: Resolved/Met  Goal: Paranoid Ideation Interventions  Outcome: Resolved/Met     Problem: Falls - Risk of  Goal: *Absence of Falls  Description: Document Attila Giraldo Fall Risk and appropriate interventions in the flowsheet.   Outcome: Resolved/Met  Note: Fall Risk Interventions:  Mobility Interventions: Utilize walker, cane, or other assistive device    Mentation Interventions: Adequate sleep, hydration, pain control    Medication Interventions: Teach patient to arise slowly                   Problem: Patient Education: Go to Patient Education Activity  Goal: Patient/Family Education  Outcome: Resolved/Met     Problem: Discharge Planning  Goal: *Discharge to safe environment  Outcome: Resolved/Met  Goal: *Knowledge of medication management  Outcome: Resolved/Met  Goal: *Knowledge of discharge instructions  Outcome: Resolved/Met

## 2021-05-11 NOTE — PROGRESS NOTES
[phone] Report received from LEX Vazquez    0724 Patient awake laying in bed. Patient calm and cooperative with assessment. Patient stated, \"alright\" when asked how he was feeling today. AAOX3. Reoriented to situation. Speech clear. Resp. Even and unlabored. NAD noted. Skin WNL for race. Ambulates with steady gait without the use of assistive devices. Patient denies SI/HI, AVH, and pain. Patient reports anxiety is a 9 and depression is a 1 at this time. No other needs, wants, or concerns voiced at this time. Will continue to monitor. 9634 Patient laying in bed with eyes closed. NAD noted. 1111 Patient awake laying in bed. NAD noted. [id number] Patient laying in bed with eyes closed. NAD noted. [phone] Patient sitting in a wheelchair in the hallway. NAD noted. 2472 Patient discharging home. Discharge instructions explained to patient. Patient verbalized understanding. Patient was given all his belongings, valuables, and medications. Patient refused Lyft scheduled ride and reported he would take the bus. Stephani Thompson, [address] [place] notified. Patient escorted out of the building with all his belongings and discharge packet at this time. Patient escorted out the door by the bus stop and explained the bus stop was in front of the door by the side walk. Patient verbalized understanding and started ambulating in that direction.      Problem: Paranoid Ideation  Goal: *STG: Stop being accusatory of others  Outcome: Progressing Towards Goal  Goal: *STG:Report interacting socially without fear or suspicion  Outcome: Progressing Towards Goal

## 2021-05-11 NOTE — PROGRESS NOTES
1955: Pt resting in his room quietly. Pt denied SI/HI/AH/VH. Pt denied pain. Pt denied anxiety. Pt denied depression. Pt refused vitals at this time. Writer informed pt that he has labs in the morning and asked if they could take vitals then, pt verbally agreed. 2128: Pt accepted scheduled medication. No issues observed at this time. 0000: Pt appears asleep, breathing visible. 0300: Pt appears asleep, breathing visible. 0600: Pt accepted labs. No issues observed. Pt slept for 7 hours and 45 minutes            Problem: Patient Education: Go to Patient Education Activity  Goal: Patient/Family Education  Outcome: Progressing Towards Goal     Problem: Paranoid Ideation  Goal: *LTG: Show more trust in others by speaking positively of them & reporting comfort in socializing  Outcome: Progressing Towards Goal     Problem: Falls - Risk of  Goal: *Absence of Falls  Description: Document Vance Bare Fall Risk and appropriate interventions in the flowsheet.   Outcome: Progressing Towards Goal  Note: Fall Risk Interventions:  Mobility Interventions: Utilize walker, cane, or other assistive device    Mentation Interventions: Adequate sleep, hydration, pain control    Medication Interventions: Teach patient to arise slowly

## 2021-05-12 LAB
ATRIAL RATE: 77 BPM
CALCULATED P AXIS, ECG09: 74 DEGREES
CALCULATED R AXIS, ECG10: 69 DEGREES
CALCULATED T AXIS, ECG11: 51 DEGREES
DIAGNOSIS, 93000: NORMAL
EST. AVERAGE GLUCOSE BLD GHB EST-MCNC: 105 MG/DL
HBA1C MFR BLD: 5.3 % (ref 4–5.6)
P-R INTERVAL, ECG05: 170 MS
Q-T INTERVAL, ECG07: 372 MS
QRS DURATION, ECG06: 106 MS
QTC CALCULATION (BEZET), ECG08: 420 MS
VENTRICULAR RATE, ECG03: 77 BPM

## 2021-06-07 ENCOUNTER — HOSPITAL ENCOUNTER (INPATIENT)
Age: 46
LOS: 2 days | Discharge: HOME | DRG: 750 | End: 2021-06-10
Attending: EMERGENCY MEDICINE | Admitting: PSYCHIATRY & NEUROLOGY
Payer: MEDICAID

## 2021-06-07 DIAGNOSIS — F14.10 COCAINE ABUSE (HCC): ICD-10-CM

## 2021-06-07 DIAGNOSIS — F25.0 SCHIZOAFFECTIVE DISORDER, BIPOLAR TYPE (HCC): ICD-10-CM

## 2021-06-07 DIAGNOSIS — F12.10 MARIJUANA ABUSE: ICD-10-CM

## 2021-06-07 DIAGNOSIS — F14.10 COCAINE USE DISORDER, MILD, ABUSE (HCC): ICD-10-CM

## 2021-06-07 DIAGNOSIS — F22 DELUSIONS (HCC): Primary | ICD-10-CM

## 2021-06-07 DIAGNOSIS — F20.9 SCHIZOPHRENIA, UNSPECIFIED TYPE (HCC): ICD-10-CM

## 2021-06-07 LAB
ALBUMIN SERPL-MCNC: 3.8 G/DL (ref 3.5–5)
ALBUMIN/GLOB SERPL: 1 {RATIO} (ref 1.1–2.2)
ALP SERPL-CCNC: 92 U/L (ref 45–117)
ALT SERPL-CCNC: 22 U/L (ref 12–78)
AMORPH CRY URNS QL MICRO: ABNORMAL
AMPHET UR QL SCN: NEGATIVE
ANION GAP SERPL CALC-SCNC: 8 MMOL/L (ref 5–15)
APPEARANCE UR: ABNORMAL
AST SERPL-CCNC: 20 U/L (ref 15–37)
BACTERIA URNS QL MICRO: NEGATIVE /HPF
BARBITURATES UR QL SCN: NEGATIVE
BASOPHILS # BLD: 0.1 K/UL (ref 0–0.1)
BASOPHILS NFR BLD: 2 % (ref 0–1)
BENZODIAZ UR QL: NEGATIVE
BILIRUB SERPL-MCNC: 1 MG/DL (ref 0.2–1)
BILIRUB UR QL: NEGATIVE
BUN SERPL-MCNC: 6 MG/DL (ref 6–20)
BUN/CREAT SERPL: 6 (ref 12–20)
CALCIUM SERPL-MCNC: 9 MG/DL (ref 8.5–10.1)
CANNABINOIDS UR QL SCN: POSITIVE
CAOX CRY URNS QL MICRO: ABNORMAL
CHLORIDE SERPL-SCNC: 107 MMOL/L (ref 97–108)
CO2 SERPL-SCNC: 29 MMOL/L (ref 21–32)
COCAINE UR QL SCN: POSITIVE
COLOR UR: ABNORMAL
CREAT SERPL-MCNC: 1.04 MG/DL (ref 0.7–1.3)
DIFFERENTIAL METHOD BLD: ABNORMAL
DRUG SCRN COMMENT,DRGCM: ABNORMAL
EOSINOPHIL # BLD: 0.3 K/UL (ref 0–0.4)
EOSINOPHIL NFR BLD: 11 % (ref 0–7)
EPITH CASTS URNS QL MICRO: ABNORMAL /LPF
ERYTHROCYTE [DISTWIDTH] IN BLOOD BY AUTOMATED COUNT: 13.2 % (ref 11.5–14.5)
ETHANOL SERPL-MCNC: <10 MG/DL
FLUAV RNA SPEC QL NAA+PROBE: NOT DETECTED
FLUBV RNA SPEC QL NAA+PROBE: NOT DETECTED
GLOBULIN SER CALC-MCNC: 3.8 G/DL (ref 2–4)
GLUCOSE SERPL-MCNC: 87 MG/DL (ref 65–100)
GLUCOSE UR STRIP.AUTO-MCNC: NEGATIVE MG/DL
HCT VFR BLD AUTO: 43.8 % (ref 36.6–50.3)
HGB BLD-MCNC: 14.4 G/DL (ref 12.1–17)
HGB UR QL STRIP: NEGATIVE
IMM GRANULOCYTES # BLD AUTO: 0 K/UL (ref 0–0.04)
IMM GRANULOCYTES NFR BLD AUTO: 0 % (ref 0–0.5)
KETONES UR QL STRIP.AUTO: NEGATIVE MG/DL
LEUKOCYTE ESTERASE UR QL STRIP.AUTO: NEGATIVE
LYMPHOCYTES # BLD: 1.3 K/UL (ref 0.8–3.5)
LYMPHOCYTES NFR BLD: 40 % (ref 12–49)
MCH RBC QN AUTO: 29.3 PG (ref 26–34)
MCHC RBC AUTO-ENTMCNC: 32.9 G/DL (ref 30–36.5)
MCV RBC AUTO: 89 FL (ref 80–99)
METHADONE UR QL: NEGATIVE
MONOCYTES # BLD: 0.4 K/UL (ref 0–1)
MONOCYTES NFR BLD: 13 % (ref 5–13)
NEUTS SEG # BLD: 1.1 K/UL (ref 1.8–8)
NEUTS SEG NFR BLD: 34 % (ref 32–75)
NITRITE UR QL STRIP.AUTO: NEGATIVE
NRBC # BLD: 0 K/UL (ref 0–0.01)
NRBC BLD-RTO: 0 PER 100 WBC
OPIATES UR QL: NEGATIVE
PCP UR QL: NEGATIVE
PH UR STRIP: 7 [PH] (ref 5–8)
PLATELET # BLD AUTO: 228 K/UL (ref 150–400)
PMV BLD AUTO: 9 FL (ref 8.9–12.9)
POTASSIUM SERPL-SCNC: 3.9 MMOL/L (ref 3.5–5.1)
PROT SERPL-MCNC: 7.6 G/DL (ref 6.4–8.2)
PROT UR STRIP-MCNC: NEGATIVE MG/DL
RBC # BLD AUTO: 4.92 M/UL (ref 4.1–5.7)
RBC #/AREA URNS HPF: ABNORMAL /HPF (ref 0–5)
SARS-COV-2, COV2: NOT DETECTED
SODIUM SERPL-SCNC: 144 MMOL/L (ref 136–145)
SP GR UR REFRACTOMETRY: 1.02 (ref 1–1.03)
UA: UC IF INDICATED,UAUC: ABNORMAL
UROBILINOGEN UR QL STRIP.AUTO: 4 EU/DL (ref 0.2–1)
WBC # BLD AUTO: 3.1 K/UL (ref 4.1–11.1)
WBC URNS QL MICRO: ABNORMAL /HPF (ref 0–4)

## 2021-06-07 PROCEDURE — 74011250636 HC RX REV CODE- 250/636: Performed by: EMERGENCY MEDICINE

## 2021-06-07 PROCEDURE — 80307 DRUG TEST PRSMV CHEM ANLYZR: CPT

## 2021-06-07 PROCEDURE — 87636 SARSCOV2 & INF A&B AMP PRB: CPT

## 2021-06-07 PROCEDURE — 81001 URINALYSIS AUTO W/SCOPE: CPT

## 2021-06-07 PROCEDURE — 82077 ASSAY SPEC XCP UR&BREATH IA: CPT

## 2021-06-07 PROCEDURE — 85025 COMPLETE CBC W/AUTO DIFF WBC: CPT

## 2021-06-07 PROCEDURE — 80053 COMPREHEN METABOLIC PANEL: CPT

## 2021-06-07 PROCEDURE — 99285 EMERGENCY DEPT VISIT HI MDM: CPT

## 2021-06-07 PROCEDURE — 36415 COLL VENOUS BLD VENIPUNCTURE: CPT

## 2021-06-07 PROCEDURE — 96372 THER/PROPH/DIAG INJ SC/IM: CPT

## 2021-06-07 RX ORDER — LORAZEPAM 2 MG/ML
2 INJECTION INTRAMUSCULAR
Status: COMPLETED | OUTPATIENT
Start: 2021-06-07 | End: 2021-06-07

## 2021-06-07 RX ORDER — HALOPERIDOL 5 MG/ML
5 INJECTION INTRAMUSCULAR
Status: COMPLETED | OUTPATIENT
Start: 2021-06-07 | End: 2021-06-07

## 2021-06-07 RX ORDER — HALOPERIDOL 5 MG/ML
5 INJECTION INTRAMUSCULAR
Status: DISCONTINUED | OUTPATIENT
Start: 2021-06-07 | End: 2021-06-07

## 2021-06-07 RX ADMIN — LORAZEPAM 2 MG: 2 INJECTION INTRAMUSCULAR; INTRAVENOUS at 14:07

## 2021-06-07 RX ADMIN — HALOPERIDOL LACTATE 5 MG: 5 INJECTION, SOLUTION INTRAMUSCULAR at 14:07
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
## 2021-06-07 NOTE — ED TRIAGE NOTES
Pt presents to ED via [employer] under an ECO, prescreen completed by PACT. Pt has handcuffs placed behind back. Pt repeating \"don't question God\". Pt requesting food. Pt states \"I am God\".
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
## 2021-06-07 NOTE — ED NOTES
Pt continues to be agitated as evidenced by yelling at staff and PD, upset stating that we are \"dumb\" and to not ask him any questions. Pt demanding real food. MD at bedside. Unable to change pt into gown d/t patient condition at this time.

## 2021-06-07 NOTE — ED NOTES
Lab results faxed to [facility] [phone]. [facility] aware pt has not provided urine at this time.

## 2021-06-07 NOTE — ED NOTES
Bedside and Verbal shift change report given to Arely Garcia RN (oncoming nurse) by me (offgoing nurse). Report included the following information SBAR, Kardex and ED Summary.

## 2021-06-08 PROBLEM — F20.9 SCHIZOPHRENIA (HCC): Status: ACTIVE | Noted: 2021-06-08

## 2021-06-08 LAB
ATRIAL RATE: 61 BPM
CALCULATED P AXIS, ECG09: 71 DEGREES
CALCULATED R AXIS, ECG10: 84 DEGREES
CALCULATED T AXIS, ECG11: 32 DEGREES
DIAGNOSIS, 93000: NORMAL
P-R INTERVAL, ECG05: 170 MS
Q-T INTERVAL, ECG07: 392 MS
QRS DURATION, ECG06: 90 MS
QTC CALCULATION (BEZET), ECG08: 394 MS
VENTRICULAR RATE, ECG03: 61 BPM

## 2021-06-08 PROCEDURE — 74011250637 HC RX REV CODE- 250/637: Performed by: PSYCHIATRY & NEUROLOGY

## 2021-06-08 PROCEDURE — 93005 ELECTROCARDIOGRAM TRACING: CPT

## 2021-06-08 PROCEDURE — 65220000003 HC RM SEMIPRIVATE PSYCH

## 2021-06-08 RX ORDER — ACETAMINOPHEN 325 MG/1
650 TABLET ORAL
Status: DISCONTINUED | OUTPATIENT
Start: 2021-06-08 | End: 2021-06-10 | Stop reason: HOSPADM

## 2021-06-08 RX ORDER — FLUPHENAZINE HYDROCHLORIDE 5 MG/1
5 TABLET ORAL 2 TIMES DAILY
Status: DISCONTINUED | OUTPATIENT
Start: 2021-06-09 | End: 2021-06-09

## 2021-06-08 RX ORDER — DIPHENHYDRAMINE HCL 25 MG
50 CAPSULE ORAL
COMMUNITY
End: 2021-07-08

## 2021-06-08 RX ORDER — LITHIUM CARBONATE 300 MG/1
600 CAPSULE ORAL EVERY MORNING
Status: ON HOLD | COMMUNITY
End: 2021-06-09

## 2021-06-08 RX ORDER — HALOPERIDOL 5 MG/ML
5 INJECTION INTRAMUSCULAR
Status: DISCONTINUED | OUTPATIENT
Start: 2021-06-08 | End: 2021-06-10 | Stop reason: HOSPADM

## 2021-06-08 RX ORDER — LITHIUM CARBONATE 300 MG/1
600 TABLET, FILM COATED, EXTENDED RELEASE ORAL DAILY
Status: DISCONTINUED | OUTPATIENT
Start: 2021-06-09 | End: 2021-06-10 | Stop reason: HOSPADM

## 2021-06-08 RX ORDER — FLUPHENAZINE DECANOATE 25 MG/ML
25 INJECTION, SOLUTION INTRAMUSCULAR; SUBCUTANEOUS EVERY 2 WEEKS
Status: DISCONTINUED | OUTPATIENT
Start: 2021-06-08 | End: 2021-06-08

## 2021-06-08 RX ORDER — TRAZODONE HYDROCHLORIDE 50 MG/1
50 TABLET ORAL
Status: DISCONTINUED | OUTPATIENT
Start: 2021-06-08 | End: 2021-06-10 | Stop reason: HOSPADM

## 2021-06-08 RX ORDER — FLUPHENAZINE HYDROCHLORIDE 5 MG/1
5 TABLET ORAL DAILY
Status: DISCONTINUED | OUTPATIENT
Start: 2021-06-08 | End: 2021-06-08

## 2021-06-08 RX ORDER — BENZTROPINE MESYLATE 1 MG/1
1 TABLET ORAL
Status: DISCONTINUED | OUTPATIENT
Start: 2021-06-08 | End: 2021-06-10 | Stop reason: HOSPADM

## 2021-06-08 RX ORDER — OLANZAPINE 5 MG/1
5 TABLET ORAL
Status: DISCONTINUED | OUTPATIENT
Start: 2021-06-08 | End: 2021-06-10 | Stop reason: HOSPADM

## 2021-06-08 RX ORDER — DIPHENHYDRAMINE HYDROCHLORIDE 50 MG/ML
50 INJECTION, SOLUTION INTRAMUSCULAR; INTRAVENOUS
Status: DISCONTINUED | OUTPATIENT
Start: 2021-06-08 | End: 2021-06-10 | Stop reason: HOSPADM

## 2021-06-08 RX ORDER — ADHESIVE BANDAGE
30 BANDAGE TOPICAL DAILY PRN
Status: DISCONTINUED | OUTPATIENT
Start: 2021-06-08 | End: 2021-06-10 | Stop reason: HOSPADM

## 2021-06-08 RX ORDER — HYDROXYZINE 25 MG/1
50 TABLET, FILM COATED ORAL
Status: DISCONTINUED | OUTPATIENT
Start: 2021-06-08 | End: 2021-06-10 | Stop reason: HOSPADM

## 2021-06-08 RX ORDER — LORAZEPAM 2 MG/ML
1 INJECTION INTRAMUSCULAR
Status: DISCONTINUED | OUTPATIENT
Start: 2021-06-08 | End: 2021-06-10 | Stop reason: HOSPADM

## 2021-06-08 RX ORDER — OLANZAPINE 5 MG/1
10 TABLET, ORALLY DISINTEGRATING ORAL
Status: DISCONTINUED | OUTPATIENT
Start: 2021-06-08 | End: 2021-06-10 | Stop reason: HOSPADM

## 2021-06-08 RX ORDER — FLUPHENAZINE HYDROCHLORIDE 10 MG/1
10 TABLET ORAL 2 TIMES DAILY
Status: ON HOLD | COMMUNITY
End: 2021-06-09

## 2021-06-08 RX ORDER — LITHIUM CARBONATE 450 MG/1
900 TABLET ORAL
Status: DISCONTINUED | OUTPATIENT
Start: 2021-06-08 | End: 2021-06-10 | Stop reason: HOSPADM

## 2021-06-08 RX ORDER — FLUPHENAZINE DECANOATE 25 MG/ML
25 INJECTION, SOLUTION INTRAMUSCULAR; SUBCUTANEOUS EVERY 2 WEEKS
Status: DISCONTINUED | OUTPATIENT
Start: 2021-06-09 | End: 2021-06-10 | Stop reason: HOSPADM

## 2021-06-08 RX ORDER — OLANZAPINE 10 MG/1
10 TABLET, ORALLY DISINTEGRATING ORAL
COMMUNITY
End: 2021-07-08

## 2021-06-08 RX ORDER — DIPHENHYDRAMINE HCL 25 MG
50 CAPSULE ORAL
Status: DISCONTINUED | OUTPATIENT
Start: 2021-06-08 | End: 2021-06-10 | Stop reason: HOSPADM

## 2021-06-08 RX ADMIN — OLANZAPINE 10 MG: 5 TABLET, ORALLY DISINTEGRATING ORAL at 21:53

## 2021-06-08 RX ADMIN — DIPHENHYDRAMINE HYDROCHLORIDE 50 MG: 25 CAPSULE ORAL at 21:54

## 2021-06-08 RX ADMIN — LITHIUM CARBONATE 900 MG: 450 TABLET, EXTENDED RELEASE ORAL at 21:53

## 2021-06-08 NOTE — ED NOTES
Pt has been admitted here per [facility]. TRANSFER - OUT REPORT:    Verbal report given to [facility]) on Rhae Dials  being transferred to [facility](unit) for routine progression of care       Report consisted of patients Situation, Background, Assessment and   Recommendations(SBAR). Information from the following report(s) SBAR was reviewed with the receiving nurse. Lines:       Opportunity for questions and clarification was provided.       Patient transported with:   security and officers

## 2021-06-08 NOTE — ED NOTES
Pt resting upright on the stretcher in a position of comfort and in no acute distress. Pt A and O x 4. RR even and unlabored. Skin warm and dry. Call bell in reach. Pt remains calm. Pt w/ bilateral law enforcement restraints to the front. Call bell in reach. [employer] at bedside.

## 2021-06-08 NOTE — ED NOTES
Pt resting upright on the stretcher in a position of comfort and in no acute distress. Pt A and O x 4. RR even and unlabored. Skin warm and dry. Call bell in reach. [employer] at bedside. Pt in bilateral law enforcement restraints to the front.

## 2021-06-08 NOTE — GROUP NOTE
NIMISHA  GROUP DOCUMENTATION INDIVIDUAL Group Therapy Note Date: 6/8/2021 Group Start Time: 1500 Group End Time: 5719 Group Topic: Recreational/Music Therapy [facility] [address] ACUTE BEHAV [facility] [place], [address] GROUP DOCUMENTATION GROUP Group Therapy Note Attendees: 5 Attendance: Did not attend Patient's Goal: Interventions/techniques: 
Mary Lyles

## 2021-06-08 NOTE — ED NOTES
Pt agreeable to vitals. Still awaiting bed placement per [facility], currently on waitlist for [facility].

## 2021-06-08 NOTE — ED NOTES
Attempted to obtain EKG and updated VS. Pt adamantly refusing both at this time. Pt remains calm. [employer] officer at bedside speaking w/ pt. Pt stating \"I am God, you can't do this to God. I've been in cuffs 24 hours, I'm gonna start a lawsuit and ethel. I haven't even had dinner\".
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
## 2021-06-08 NOTE — ED NOTES
Pt remains calm and cooperative w/ this RN. Pt provided w/ assortment of snacks, sandwiches, and sodas. Pt noted to be demanding. Pt resting upright on the stretcher in a position of comfort and in no acute distress. Pt A and O x 4. RR even and unlabored. Skin warm and dry. Call bell in reach. [employer] at bedside. Pt in bilateral law enforcement restraints to the front.

## 2021-06-08 NOTE — ED NOTES
Bedside and Verbal shift change report given to Raul Levy (oncoming nurse) by Blanka José (offgoing nurse). Report included the following information SBAR, Kardex, ED Summary and Recent Results.

## 2021-06-08 NOTE — ED NOTES
Hourly rounding completed on this pt. Offered assistance for toileting or hygiene at this time. Provided opportunity for snack nourishment or PO fluid hydration. Pt is up-to-date on plan of care. No pain interventions required at this time. Warm blanket offered, call bell within reach, safety precautions in place, bed locked and in the lowest position. Pt resting with eyes closed, handcuffs in front. Cristian OSBORN with patient.

## 2021-06-08 NOTE — PROGRESS NOTES
[facility] Admission Pharmacy Medication Reconciliation- IN PROGRESS    Information obtained from: [place] ([phone])   RxQuery data available1:No    Comments/recommendations:    1) Unable to interview patient at this time due to patient condition. Recent dispense history obtained per [place]   Per pharmacy, PTA medications last filled on 6/3/21. Pharmacy last filled fluphenazine injection in March 2021, however patient received  fluphenazine injection prior to discharge from [facility] on 5/11/21. Upon clarification, no dispense record found of lithium 900mg at bedtime per [facility]. Unable to reach [facility] at this time to confirm medication administration. Pharmacy to contact [facility] for current [facility] tomorrow. 2) Medication changes (since last review): Added  None   Removed  Lithium Carbonate CR 450mg tablet  Fluphenazine 25mg/ml Injection  Adjusted  Fluphenazine tablets-adjusted to 10mg bid     3) The [place] Prescription Monitoring Program () was accessed to determine fill history of any controlled medications. No record found in last 6 months. 1RxQuery pharmacy benefit data reflects medications filled and processed through the patient's insurance, however                this data does NOT capture whether the medication was picked up or is currently being taken by the patient. Patient allergies: Allergies as of 06/07/2021    (No Known Allergies)         Prior to Admission Medications   Prescriptions Last Dose Informant Patient Reported? Taking? OLANZapine (ZyPREXA zydis) 10 mg disintegrating tablet Unknown at Unknown time  Yes No   Sig: Take 10 mg by mouth nightly. diphenhydrAMINE (BENADRYL) 25 mg capsule Unknown at Unknown time  Yes No   Sig: Take 50 mg by mouth nightly. fluPHENAZine (PROLIXIN) 10 mg tablet Unknown at Unknown time  Yes No   Sig: Take 10 mg by mouth two (2) times a day.    lithium carbonate 300 mg capsule Unknown at Unknown time  Yes No   Sig: Take 600 mg by mouth Every morning.       Facility-Administered Medications: None          Thank you,  Jeremy Santos PHARMD

## 2021-06-08 NOTE — BH NOTES
GROUP THERAPY PROGRESS NOTE    Patient did not participate in Coping Skills group.      BINDU Woodson

## 2021-06-08 NOTE — ED NOTES
Reached out to [facility] team for update on bed placement, discussed with Scooter who will pass along to his  to return call with update.

## 2021-06-08 NOTE — ED NOTES
Pt continues to rest in L side lying position. Pt resting w/ eyes closed. Pt appears comfortable and in no acute distress. RR even and unlabored. Skin warm and dry. Lights remain dimmed. [employer] officer at bedside.

## 2021-06-08 NOTE — ED NOTES
Pt provided with no pork breakfast tray and additional cranberry juices per patient request. Hourly rounding completed on this pt. Offered assistance for toileting or hygiene at this time. Pt is up-to-date on plan of care, awaiting bed placement per [facility]. No pain interventions required at this time. Warm blanket offered, call bell within reach, safety precautions in place, bed locked and in the lowest position. [employer] OSBORN at the bedside.

## 2021-06-08 NOTE — ED NOTES
Jeremy Bailey assigned to pt. Reports that pt has a 7th hour TDO and a bed search is still in process. EKG faxed to [phone]. [employer] remains at bedside.

## 2021-06-08 NOTE — ED NOTES
Hourly rounding completed on this pt. Offered assistance for toileting or hygiene at this time. Provided opportunity for snack nourishment or PO fluid hydration. Pt is up-to-date on plan of care. No pain interventions required at this time. Warm blanket offered, call bell within reach, safety precautions in place, bed locked and in the lowest position. Pt resting with eyes closed, [place] [employer] with patient.

## 2021-06-08 NOTE — ED NOTES
Pt awake, cooperative for vitals, requesting breakfast tray with no pork products and cranberry juice. Breakfast tray ordered for patient. Pt in handcuffs with no skin breakdown under cuffs. Pt provided with warm blankets per his request, offered toileting before breakfast arrives. Pt voices no complaints. Bed rails up, bed in low position, room secure. Trash removed from room. Pt appears in no distress at this time. Cristian OSBORN at the bedside.

## 2021-06-09 PROBLEM — F20.9 SCHIZOPHRENIA (HCC): Status: RESOLVED | Noted: 2021-06-08 | Resolved: 2021-06-09

## 2021-06-09 PROCEDURE — 74011250637 HC RX REV CODE- 250/637: Performed by: PSYCHIATRY & NEUROLOGY

## 2021-06-09 PROCEDURE — 74011250636 HC RX REV CODE- 250/636: Performed by: PSYCHIATRY & NEUROLOGY

## 2021-06-09 PROCEDURE — 65220000003 HC RM SEMIPRIVATE PSYCH

## 2021-06-09 PROCEDURE — 99223 1ST HOSP IP/OBS HIGH 75: CPT | Performed by: PSYCHIATRY & NEUROLOGY

## 2021-06-09 RX ORDER — FLUPHENAZINE HYDROCHLORIDE 5 MG/1
5 TABLET ORAL DAILY
Status: DISCONTINUED | OUTPATIENT
Start: 2021-06-10 | End: 2021-06-10 | Stop reason: HOSPADM

## 2021-06-09 RX ORDER — FLUPHENAZINE DECANOATE 25 MG/ML
25 INJECTION, SOLUTION INTRAMUSCULAR; SUBCUTANEOUS EVERY 2 WEEKS
COMMUNITY
End: 2021-06-10

## 2021-06-09 RX ORDER — LITHIUM CARBONATE 300 MG/1
600 TABLET, FILM COATED, EXTENDED RELEASE ORAL DAILY
COMMUNITY
End: 2021-07-08

## 2021-06-09 RX ORDER — FLUPHENAZINE HYDROCHLORIDE 5 MG/1
5 TABLET ORAL DAILY
COMMUNITY
End: 2021-07-08

## 2021-06-09 RX ORDER — LITHIUM CARBONATE 450 MG/1
900 TABLET ORAL
COMMUNITY
End: 2021-07-08

## 2021-06-09 RX ADMIN — FLUPHENAZINE HYDROCHLORIDE 5 MG: 5 TABLET, FILM COATED ORAL at 08:53

## 2021-06-09 RX ADMIN — DIPHENHYDRAMINE HYDROCHLORIDE 50 MG: 25 CAPSULE ORAL at 21:38

## 2021-06-09 RX ADMIN — LITHIUM CARBONATE 900 MG: 450 TABLET, EXTENDED RELEASE ORAL at 21:38

## 2021-06-09 RX ADMIN — OLANZAPINE 10 MG: 5 TABLET, ORALLY DISINTEGRATING ORAL at 21:36

## 2021-06-09 RX ADMIN — LITHIUM CARBONATE 600 MG: 300 TABLET, FILM COATED, EXTENDED RELEASE ORAL at 08:51

## 2021-06-09 RX ADMIN — FLUPHENAZINE DECANOATE 25 MG: 25 INJECTION, SOLUTION INTRAMUSCULAR; SUBCUTANEOUS at 10:00

## 2021-06-09 RX ADMIN — TRAZODONE HYDROCHLORIDE 50 MG: 50 TABLET ORAL at 21:38

## 2021-06-09 NOTE — PROGRESS NOTES
Behavioral Services  Medicare Certification Upon Admission    I certify that this patient's inpatient psychiatric hospital admission is medically necessary for:    [x] (1) Treatment which could reasonably be expected to improve this patient's condition,       [x] (2) Or for diagnostic study;     AND     [x](2) The inpatient psychiatric services are provided while the individual is under the care of a physician and are included in the individualized plan of care.     Estimated length of stay/service 5-7 days    Plan for post-hospital care home    Electronically signed by Rommel Reyes MD on 6/9/2021 at 9:31 AM

## 2021-06-09 NOTE — PROGRESS NOTES
Assumed care of patient after receiving shift report from outgoing nurse. Patient was withdrawn to his room, laying in bed. Pt stated that his last name [facility] was \"the fake one\" and that his first name is really \"Kleber\". When asked his  pt stated \"I gotta get one. I'm a alien\". Disoriented to time and situation. Affect was constricted. Hygiene is poor, Pt is messy. Independent in ADLs. Gait is steady. Sleep and appetite patterns WNL. Denies AVH/SI/HI. Endorses Anxiety/Depression from \"being here\". Last BM was 21. Pt denies pain. Pt encouraged to continue to participate in care. : Pt accepted scheduled medication. : Pt appears asleep. NAD noted. Pt has been observed throughout the night. Total hours slept approximately 10. No s/s of distress noted. Patient has remained safe.

## 2021-06-09 NOTE — GROUP NOTE
NIMISHA  GROUP DOCUMENTATION INDIVIDUAL Group Therapy Note Date: 6/9/2021 Group Start Time: 1000 Group End Time: 1100 Group Topic: Topic Group [facility] [place] [facility] ACUTE BEHAV [facility] [place], [address] [facility] GROUP DOCUMENTATION GROUP Group Therapy Note Attendees: 6 Attendance: Did not attend Patient's Goal: Interventions/techniquesAlice Munson

## 2021-06-09 NOTE — BH NOTES
GROUP THERAPY PROGRESS NOTE    Patient did not participate in Coping Skills group.      Neldon Dance LPC LSATP CSAC

## 2021-06-09 NOTE — PROGRESS NOTES
Problem: Psychosis  Goal: *STG: Remains safe in hospital  Outcome: Progressing Towards Goal  Goal: *STG: Seeks staff when feelings of self harm or harm towards others arise  Outcome: Progressing Towards Goal  Goal: *STG/LTG: Complies with medication therapy  Outcome: Progressing Towards Goal     Problem: Falls - Risk of  Goal: *Absence of Falls  Description: Document Fredy Fall Risk and appropriate interventions in the flowsheet.   Outcome: Progressing Towards Goal  Note: Fall Risk Interventions:       Mentation Interventions: Reorient patient    Medication Interventions: Teach patient to arise slowly

## 2021-06-09 NOTE — GROUP NOTE
NIMISHA  GROUP DOCUMENTATION INDIVIDUAL Group Therapy Note Date: 6/9/2021 Group Start Time: 1400 Group End Time: 1500 Group Topic: Recreational/Music Therapy [facility] [place] [id number] ACUTE BEHAV [facility] [place], [address] GROUP DOCUMENTATION GROUP Group Therapy Note Attendees: 7 Attendance: Did not attend Patient's Goal: Interventions/techniquesAlice Munson

## 2021-06-09 NOTE — BH NOTES
GROUP THERAPY PROGRESS NOTE    Patient did not participate in Discharge Planning Group.      Ama Alt LPC LSATP CSAC
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
## 2021-06-09 NOTE — PROGRESS NOTES
[facility] Admission Pharmacy Medication Reconciliation - ADDENDUM    Information obtained from: [facility] PACT team    Comments/recommendations:  Per [facility] PACT team, the medications the patient is currently prescribed are the same as from discharge from [facility] in May. However, he has not been compliant with medications. Confirmed with [facility] PACT that patient last received fluphenazine decanoate injection during [facility] admission on 21. He is overdue for his injection. Medication changes (since last review): Added  Fluphenazine decanoate  Adjusted  Lithium dosing  Fluphenazine tablet strength and frequency      [facility] reflects medications filled and processed through the patient's insurance, however                this data does NOT capture whether the medication was picked up or is currently being taken by the patient. Total Time Spent: 10 minutes      Prior to Admission Medications   Prescriptions Last Dose Informant Patient Reported? Taking? OLANZapine (ZyPREXA zydis) 10 mg disintegrating tablet Unknown at Unknown time  Yes No   Sig: Take 10 mg by mouth nightly. diphenhydrAMINE (BENADRYL) 25 mg capsule Unknown at Unknown time  Yes No   Sig: Take 50 mg by mouth nightly. Indications: extrapyramidal symptoms as a result of taking the medication   fluPHENAZine (PROLIXIN) 5 mg tablet Unknown at Unknown time  Yes No   Sig: Take 5 mg by mouth daily. Indications: schizophrenia   fluPHENAZine decanoate (PROLIXIN) 25 mg/mL injection 2021  Yes Yes   Si mg by IntraMUSCular route Once every 2 weeks. lithium carbonate CR (ESKALITH CR) 450 mg CR tablet Unknown at Unknown time  Yes No   Sig: Take 900 mg by mouth nightly. lithium carbonate SR (LITHOBID) 300 mg CR tablet Unknown at Unknown time  Yes No   Sig: Take 600 mg by mouth daily.       Facility-Administered Medications: None          Thank you,  Sandra Beverly, PHARMD, [facility]  Clinical Pharmacy Specialist, Behavioral Health  Desk: [phone] ([id number]  Pharmacy: [phone] ([id number])

## 2021-06-09 NOTE — BH NOTES
GROUP THERAPY PROGRESS NOTE    Patient did not participate in Process group.      Ahmet Angeles LPC [facility]

## 2021-06-09 NOTE — BH NOTES
PSYCHOSOCIAL ASSESSMENT  :Patient identifying info:   Gianni Ashley is a 41 y.o., male admitted 6/7/2021  1:22 PM     Presenting problem and precipitating factors: Patient admitted under a TDO for psychosis and delusions. Pt has been refusing his medications stating he has God and refusing to pay his rent stating he owns the building which PACT staff say could affect his housing placement. Mental status assessment: Patient remains calm and cooperative. Thought process is illogical with delusional themes. Strengths: PACT team    Collateral information: [facility] PACT team- Sanju    Current psychiatric /substance abuse providers and contact info: [facility] PACT team- Estrellita Heck    Previous psychiatric/substance abuse providers and response to treatment: Multiple prior admissions at various facilities including [facility], [facility] in 2018 and [address] [place] [address] in 2010x2, 2011, 2012, 2013, 2014, 2015, 2016     Family history of mental illness or substance abuse: Unknown     Substance abuse history:  UDS + cocaine, THC   BAL=0  Social History     Tobacco Use    Smoking status: Current Every Day Smoker     Packs/day: 1.00     Years: 10.00     Pack years: 10.00    Smokeless tobacco: Never Used   Substance Use Topics    Alcohol use: No   History of biomedical complications associated with substance abuse: none reported    Patient's current acceptance of treatment or motivation for change: admitted under TDO    Family constellation: mother and possible siblings? Is significant other involved?  No    Describe support system: PACT team    Describe living arrangements and home environment: Pt lives alone     Health issues:   Hospital Problems  Never Reviewed        Codes Class Noted POA    Schizophrenia [facility]) ICD-10-CM: F20.9  ICD-9-CM: 295.90  6/8/2021 Unknown          Trauma history: none reported    Legal issues: none reported    History of  service: None     Financial status:  SSDI    Latter-day/cultural factors: None expressed Education/work history: HS diploma     Have you been licensed as a health care professional (current or ):  N/A    Leisure and recreation preferences: watching tv or playing video games    Describe coping skills: ineffective and poor judgement      Eris Fearing  2021

## 2021-06-09 NOTE — PROGRESS NOTES
Pt is AXO to person, place, date. Pt wants to be called \"Helen\". Pt states Anxiety/Depression=0 because he is here in the hospital.    Last BM, yesterday. Pt is medication/diet compliant. Pt denies SI/HI, A/VH. Vitals WNL. Denies pain. Pt isolates to his room. Sleeping most of day shift. Pt is medication/diet compliant. Pt rec'd long acting prolixin injection, right deltoid, tolerated well. Pt had court today. No issues.

## 2021-06-09 NOTE — PROGRESS NOTES
Laboratory monitoring for mood stabilizer and antipsychotics:    Recommended baseline monitoring has been completed based on this patient's current medication regimen. The patient is currently taking the following medication(s):   Current Facility-Administered Medications   Medication Dose Route Frequency    [START ON 6/10/2021] fluPHENAZine (PROLIXIN) tablet 5 mg  5 mg Oral DAILY    diphenhydrAMINE (BENADRYL) capsule 50 mg  50 mg Oral QHS    lithium carbonate CR (ESKALITH CR) tablet 900 mg  900 mg Oral QHS    lithium carbonate SR (LITHOBID) tablet 600 mg  600 mg Oral DAILY    OLANZapine (ZyPREXA zydis) disintegrating tablet 10 mg  10 mg Oral QHS    fluPHENAZine decanoate (PROLIXIN) 25 mg/mL injection 25 mg  25 mg IntraMUSCular EVERY 2 WEEKS       Height, Weight, BMI Estimation  Estimated body mass index is 21.18 kg/m² as calculated from the following:    Height as of this encounter: 188 cm (74\"). Weight as of this encounter: 74.8 kg (165 lb). Renal Function, Hepatic Function and Chemistry  Estimated Creatinine Clearance: 95.9 mL/min (based on SCr of 1.04 mg/dL). Lab Results   Component Value Date/Time    Sodium 144 06/07/2021 01:44 PM    Potassium 3.9 06/07/2021 01:44 PM    Chloride 107 06/07/2021 01:44 PM    CO2 29 06/07/2021 01:44 PM    Anion gap 8 06/07/2021 01:44 PM    Glucose 87 06/07/2021 01:44 PM    BUN 6 06/07/2021 01:44 PM    Creatinine 1.04 06/07/2021 01:44 PM    BUN/Creatinine ratio 6 (L) 06/07/2021 01:44 PM    GFR est AA >60 06/07/2021 01:44 PM    GFR est non-AA >60 06/07/2021 01:44 PM    Calcium 9.0 06/07/2021 01:44 PM    ALT (SGPT) 22 06/07/2021 01:44 PM    Alk.  phosphatase 92 06/07/2021 01:44 PM    Protein, total 7.6 06/07/2021 01:44 PM    Albumin 3.8 06/07/2021 01:44 PM    Globulin 3.8 06/07/2021 01:44 PM    A-G Ratio 1.0 (L) 06/07/2021 01:44 PM    Bilirubin, total 1.0 06/07/2021 01:44 PM       Lab Results   Component Value Date/Time    Glucose 87 06/07/2021 01:44 PM    Glucose (POC) 80 12/11/2013 12:39 PM       Lab Results   Component Value Date/Time    Hemoglobin A1c 5.3 05/11/2021 06:00 AM       Hematology  Lab Results   Component Value Date/Time    WBC 3.1 (L) 06/07/2021 01:44 PM    Hemoglobin (POC) 13.3 12/11/2013 12:39 PM    HGB 14.4 06/07/2021 01:44 PM    Hematocrit (POC) 39 12/11/2013 12:39 PM    HCT 43.8 06/07/2021 01:44 PM    PLATELET 615 62/43/6582 01:44 PM    MCV 89.0 06/07/2021 01:44 PM       Lipids  Lab Results   Component Value Date/Time    Cholesterol, total 161 05/11/2021 06:00 AM    HDL Cholesterol 55 05/11/2021 06:00 AM    LDL, calculated 89.4 05/11/2021 06:00 AM    Triglyceride 83 05/11/2021 06:00 AM    CHOL/HDL Ratio 2.9 05/11/2021 06:00 AM       Thyroid Function    Lab Results   Component Value Date/Time    TSH 1.01 05/11/2021 06:00 AM     Vitals  Visit Vitals  /85 (BP 1 Location: Right arm, BP Patient Position: Supine)   Pulse 63   Temp 98.6 °F (37 °C)   Resp 16   Ht 188 cm (74\")   Wt 74.8 kg (165 lb)   SpO2 100%   BMI 21.18 kg/m²       Yamila Maher, [address] (pharmacy)

## 2021-06-10 VITALS
BODY MASS INDEX: 21.17 KG/M2 | TEMPERATURE: 98.6 F | RESPIRATION RATE: 16 BRPM | WEIGHT: 165 LBS | HEART RATE: 72 BPM | HEIGHT: 74 IN | DIASTOLIC BLOOD PRESSURE: 65 MMHG | OXYGEN SATURATION: 99 % | SYSTOLIC BLOOD PRESSURE: 118 MMHG

## 2021-06-10 PROCEDURE — 74011250637 HC RX REV CODE- 250/637: Performed by: PSYCHIATRY & NEUROLOGY

## 2021-06-10 PROCEDURE — 99239 HOSP IP/OBS DSCHRG MGMT >30: CPT | Performed by: PSYCHIATRY & NEUROLOGY

## 2021-06-10 RX ORDER — FLUPHENAZINE DECANOATE 25 MG/ML
25 INJECTION, SOLUTION INTRAMUSCULAR; SUBCUTANEOUS EVERY 2 WEEKS
Qty: 1 VIAL | Refills: 0
Start: 2021-06-23 | End: 2021-07-08

## 2021-06-10 RX ADMIN — FLUPHENAZINE HYDROCHLORIDE 5 MG: 5 TABLET, FILM COATED ORAL at 08:52

## 2021-06-10 RX ADMIN — LITHIUM CARBONATE 600 MG: 300 TABLET, FILM COATED, EXTENDED RELEASE ORAL at 08:52

## 2021-06-10 NOTE — GROUP NOTE
NIMISHA  GROUP DOCUMENTATION INDIVIDUAL Group Therapy Note Date: 6/10/2021 Group Start Time: 1000 Group End Time: 1100 Group Topic: Topic Group [address] ACUTE BEHAV [facility], [facility] GROUP DOCUMENTATION GROUP Group Therapy Note Attendees: 7 Attendance: Attended Patient's Goal:  To participate in healthy relationships Fiscal[employer] game Interventions/techniques: Supported-things pertaining to relationships Interactions: minimal 
 
Mental Status: Calm Behavior/appearance: Needed prompting Goals Achieved: Additional Notes:  Pt declined active participation-left early Naveen Jeffries

## 2021-06-10 NOTE — BH NOTES
Behavioral Health Transition Record to Provider    Patient Name: José Miguel Cosby  YOB: 1975  Medical Record Number: [id number]  Date of Admission: 6/7/2021  Date of Discharge: 6/10/2021    Attending Provider: Akila Reed, *  Discharging Provider: Corinna Pagan MD  To contact this individual call [phone] and ask the  to page. If unavailable, ask to be transferred to [address] [place] [address] Provider on call. [facility] Provider will be available on call 24/7 and during holidays. Primary Care Provider: None    Allergies   Allergen Reactions    Pork Derived (Porcine) Unknown (comments)       Reason for Admission: psychosis   [facility]: [facility] [F20.9]    * No surgery found *    Results for orders placed or performed during the hospital encounter of 06/07/21   CBC WITH AUTOMATED DIFF   Result Value Ref Range    WBC 3.1 (L) 4.1 - 11.1 K/uL    RBC 4.92 4.10 - 5.70 M/uL    HGB 14.4 12.1 - 17.0 g/dL    HCT 43.8 36.6 - 50.3 %    MCV 89.0 80.0 - 99.0 FL    MCH 29.3 26.0 - 34.0 PG    MCHC 32.9 30.0 - 36.5 g/dL    RDW 13.2 11.5 - 14.5 %    PLATELET 525 195 - 407 K/uL    MPV 9.0 8.9 - 12.9 FL    NRBC 0.0 0  WBC    ABSOLUTE NRBC 0.00 0.00 - 0.01 K/uL    NEUTROPHILS 34 32 - 75 %    LYMPHOCYTES 40 12 - 49 %    MONOCYTES 13 5 - 13 %    EOSINOPHILS 11 (H) 0 - 7 %    BASOPHILS 2 (H) 0 - 1 %    IMMATURE GRANULOCYTES 0 0.0 - 0.5 %    ABS. NEUTROPHILS 1.1 (L) 1.8 - 8.0 K/UL    ABS. LYMPHOCYTES 1.3 0.8 - 3.5 K/UL    ABS. MONOCYTES 0.4 0.0 - 1.0 K/UL    ABS. EOSINOPHILS 0.3 0.0 - 0.4 K/UL    ABS. BASOPHILS 0.1 0.0 - 0.1 K/UL    ABS. IMM.  GRANS. 0.0 0.00 - 0.04 K/UL    DF AUTOMATED     METABOLIC PANEL, COMPREHENSIVE   Result Value Ref Range    Sodium 144 136 - 145 mmol/L    Potassium 3.9 3.5 - 5.1 mmol/L    Chloride 107 97 - 108 mmol/L    CO2 29 21 - 32 mmol/L    Anion gap 8 5 - 15 mmol/L    Glucose 87 65 - 100 mg/dL    BUN 6 6 - 20 MG/DL    Creatinine 1.04 0.70 - 1.30 MG/DL    BUN/Creatinine ratio 6 (L) 12 - 20      GFR est AA >60 >60 ml/min/1.73m2    GFR est non-AA >60 >60 ml/min/1.73m2    Calcium 9.0 8.5 - 10.1 MG/DL    Bilirubin, total 1.0 0.2 - 1.0 MG/DL    ALT (SGPT) 22 12 - 78 U/L    AST (SGOT) 20 15 - 37 U/L    Alk.  phosphatase 92 45 - 117 U/L    Protein, total 7.6 6.4 - 8.2 g/dL    Albumin 3.8 3.5 - 5.0 g/dL    Globulin 3.8 2.0 - 4.0 g/dL    A-G Ratio 1.0 (L) 1.1 - 2.2     ETHYL ALCOHOL   Result Value Ref Range    ALCOHOL(ETHYL),SERUM <10 <10 MG/DL   URINALYSIS W/ REFLEX CULTURE    Specimen: Urine   Result Value Ref Range    Color YELLOW/STRAW      Appearance CLOUDY (A) CLEAR      Specific gravity 1.025 1.003 - 1.030      pH (UA) 7.0 5.0 - 8.0      Protein Negative NEG mg/dL    Glucose Negative NEG mg/dL    Ketone Negative NEG mg/dL    Bilirubin Negative NEG      Blood Negative NEG      Urobilinogen 4.0 (H) 0.2 - 1.0 EU/dL    Nitrites Negative NEG      Leukocyte Esterase Negative NEG      WBC 0-4 0 - 4 /hpf    RBC 0-5 0 - 5 /hpf    Epithelial cells FEW FEW /lpf    Bacteria Negative NEG /hpf    UA:UC IF INDICATED CULTURE NOT INDICATED BY UA RESULT CNI      Amorphous Crystals 3+ (A) NEG    CA Oxalate crystals FEW (A) NEG     DRUG SCREEN, URINE   Result Value Ref Range    AMPHETAMINES Negative NEG      BARBITURATES Negative NEG      BENZODIAZEPINES Negative NEG      COCAINE Positive (A) NEG      METHADONE Negative NEG      OPIATES Negative NEG      PCP(PHENCYCLIDINE) Negative NEG      THC (TH-CANNABINOL) Positive (A) NEG      Drug screen comment (NOTE)    COVID-19 WITH INFLUENZA A/B   Result Value Ref Range    SARS-CoV-2 Not detected NOTD      Influenza A by PCR Not detected NOTD      Influenza B by PCR Not detected NOTD     EKG, 12 LEAD, INITIAL   Result Value Ref Range    Ventricular Rate 61 BPM    Atrial Rate 61 BPM    P-R Interval 170 ms    QRS Duration 90 ms    Q-T Interval 392 ms    QTC Calculation (Bezet) 394 ms    Calculated P Axis 71 degrees Calculated R Axis 84 degrees    Calculated T Axis 32 degrees    Diagnosis       ** Poor data quality, interpretation may be adversely affected  Normal sinus rhythm  Normal ECG  When compared with ECG of 11-MAY-2021 10:14,  No significant change was found  Confirmed by Estuardo Forrester M.D., Spencer Velez ([id number]) on 6/8/2021 8:26:31 AM         Immunizations administered during this encounter: There is no immunization history for the selected administration types on file for this patient. Screening for Metabolic Disorders for Patients on Antipsychotic Medications  (Data obtained from the EMR)    Estimated Body Mass Index  Estimated body mass index is 21.18 kg/m² as calculated from the following:    Height as of this encounter: 6' 2\" (1.88 m). Weight as of this encounter: 74.8 kg (165 lb). Vital Signs/Blood Pressure  Visit Vitals  /65 (BP 1 Location: Left arm, BP Patient Position: Lying)   Pulse 72   Temp 98.6 °F (37 °C) Comment: refused   Resp 16   Ht 6' 2\" (1.88 m)   Wt 74.8 kg (165 lb)   SpO2 99%   BMI 21.18 kg/m²       Blood Glucose/Hemoglobin A1c  Lab Results   Component Value Date/Time    Glucose 87 06/07/2021 01:44 PM    Glucose (POC) 80 12/11/2013 12:39 PM       Lab Results   Component Value Date/Time    Hemoglobin A1c 5.3 05/11/2021 06:00 AM        Lipid Panel  Lab Results   Component Value Date/Time    Cholesterol, total 161 05/11/2021 06:00 AM    HDL Cholesterol 55 05/11/2021 06:00 AM    LDL, calculated 89.4 05/11/2021 06:00 AM    Triglyceride 83 05/11/2021 06:00 AM    CHOL/HDL Ratio 2.9 05/11/2021 06:00 AM        Discharge Diagnosis: Please refer to physician's discharge summary. Discharge Plan:   The patient [facility] exhibits the ability to control behavior in a less restrictive environment. Patient's level of functioning is improving. No assaultive/destructive behavior has been observed for the past 24 hours. No suicidal/homicidal threat or behavior has been observed for the past 24 hours. There is no evidence of serious medication side effects. Patient has not been in physical or protective restraints for at least the past 24 hours. Discharge Medication List and Instructions:   Current Discharge Medication List      CONTINUE these medications which have CHANGED    Details   fluPHENAZine decanoate (PROLIXIN) 25 mg/mL injection 1 mL by IntraMUSCular route Once every 2 weeks. Next dose due on 6/23/21  Indications: schizophrenia  Qty: 1 Vial, Refills: 0  Start date: 6/23/2021         CONTINUE these medications which have NOT CHANGED    Details   !! lithium carbonate SR (LITHOBID) 300 mg CR tablet Take 600 mg by mouth daily. !! lithium carbonate CR (ESKALITH CR) 450 mg CR tablet Take 900 mg by mouth nightly. fluPHENAZine (PROLIXIN) 5 mg tablet Take 5 mg by mouth daily. Indications: schizophrenia      diphenhydrAMINE (BENADRYL) 25 mg capsule Take 50 mg by mouth nightly. Indications: extrapyramidal symptoms as a result of taking the medication      OLANZapine (ZyPREXA zydis) 10 mg disintegrating tablet Take 10 mg by mouth nightly. !! - Potential duplicate medications found. Please discuss with provider. Unresulted Labs (24h ago, onward) Comment    None        To obtain results of studies pending at discharge, please contact N/A. Follow-up Information     Follow up With Specialties Details Why [address] [facility] PACT  Go on 6/11/2021 You met with the PACT supervisor Sabina today and will meet with Dr. Any Oliver and team tomorrow, June 11th. [address] [place] [address]   Address: [address] [place] [address], [address] [place] [address]  Phone: [phone]  Fax: [phone]  Crisis number:  [phone]       None    None (395) Patient stated that they have no PCP            Advanced Directive:   Does the patient have an appointed surrogate decision maker? Unknown   Does the patient have a Medical Advance Directive? Unknown   Does the patient have a Psychiatric Advance Directive?  Unknown If the patient does not have a surrogate or Medical Advance Directive AND Psychiatric Advance Directive, the patient was offered information on these advance directives. Unknown     Patient Instructions: Please continue all medications until otherwise directed by physician. Tobacco Cessation Discharge Plan:   Is the patient a smoker and needs referral for smoking cessation? No  Patient referred to the following for smoking cessation with an appointment? No   Patient was offered medication to assist with smoking cessation at discharge? No  Was education for smoking cessation added to the discharge instructions? No     Alcohol/Substance Abuse Discharge Plan:   Does the patient have a history of substance/alcohol abuse and requires a referral for treatment? Yes  Patient referred to the following for substance/alcohol abuse treatment with an appointment? Yes  Patient was offered medication to assist with alcohol cessation at discharge? No  Was education for substance/alcohol abuse added to discharge instructions? Yes     Patient discharged to Home; provided to the patient/caregiver either in hard copy or electronically. Continuing care paperwork was faxed to community mental health providers.

## 2021-06-10 NOTE — SUICIDE SAFETY PLAN
SAFETY PLAN     A suicide Safety Plan is a document that supports someone when they are having thoughts of suicide. Warning Signs that indicate a suicidal crisis may be developing: What (situations, thoughts, feelings, body sensations, behaviors, etc.) do you experience that lets you know you are beginning to think about suicide? 1. Others don't believe me  2. Around other people with poor mental health  3. Internal Coping Strategies:  What things can I do (relaxation techniques, hobbies, physical activities, etc.) to take my mind off my problems without contacting another person? 1. Resting   2. chess  3. Video games  4. TV     People and social settings that provide distraction: Who can I call or where can I go to distract me? 1. Name: \"No one\"    3. Place: beach              People whom I can ask for help: Who can I call when I need help - for example, friends, family, clergy, someone else? 1. Name: \"No one\"           Professionals or [address] [place] [address] Blvd I can contact during a crisis: Who can I call for help - for example, my doctor, my psychiatrist, my psychologist, a mental health provider, a suicide hotline? 1. Clinician Name: GO Hodges Team worker Phone: [phone]      Clinician Pager or Emergency Contact #: [phone]     1. Suicide Prevention Lifeline: 2-326-009-TALK (8099)     4. [address] [place] [address] Emergency Services -  for example, [facility] Hot[facility]: [facility]      Emergency Services Address: [address], [address] [facility]      Emergency Services Phone: [phone]     Making the environment safe: How can I make my environment (house/apartment/living space) safer? For example, can I remove guns, medications, and other items? 1. No guns or weapons in home or possession  2.  No safety concerns

## 2021-06-10 NOTE — PROGRESS NOTES
Problem: Psychosis  Goal: *STG: Remains safe in hospital  Outcome: Progressing Towards Goal  Goal: *STG: Seeks staff when feelings of self harm or harm towards others arise  Outcome: Progressing Towards Goal  Goal: *STG/LTG: Complies with medication therapy  Outcome: Progressing Towards Goal     Problem: Falls - Risk of  Goal: *Absence of Falls  Description: Document Fredy Fall Risk and appropriate interventions in the flowsheet.   Outcome: Progressing Towards Goal  Note: Fall Risk Interventions:       Mentation Interventions: Adequate sleep, hydration, pain control    Medication Interventions: Teach patient to arise slowly

## 2021-06-10 NOTE — DISCHARGE INSTRUCTIONS
If I feel I am at risk of hurting myself or others, I will call the crisis office and speak with a crisis worker who will assist me during my crisis. [address]  [phone]  [facility] [place] [address] [phone]  Kehinde Ornelas [place] [address]  [phone]    Patient Education        Bipolar Disorder: Care Instructions  Your Care Instructions     Bipolar disorder is an illness that causes extreme mood changes, from times of very high energy (manic episodes) to times of depression. But many people with bipolar disorder show only the symptoms of depression. These moods may cause problems with your work, school, family life, friendships, and how well you function. This disease is also called manic-depression. There is no cure for bipolar disorder, but it can be helped with medicines. Counseling may also help. It is important to take your medicines exactly as prescribed, even when you feel well. You may need lifelong treatment. Follow-up care is a key part of your treatment and safety. Be sure to make and go to all appointments, and call your doctor if you are having problems. It's also a good idea to know your test results and keep a list of the medicines you take. How can you care for yourself at home? · Be safe with medicines. Take your medicines exactly as prescribed. Do not stop or change a medicine without talking to your doctor first. Placido Hernández and your doctor may need to try different combinations of medicines to find what works for you. · Take your medicines on schedule to keep your moods even. When you feel good, you may think that you do not need your medicines. But it is important to keep taking them. · Go to your counseling sessions. Call and talk with your counselor if you can't go to a session or if you don't think the sessions are helping. Do not just stop going.   · Get at least 27 minutes of activity on most days of the week. Walking is a good choice. You also may want to do other things, such as running, swimming, or cycling. · Get enough sleep. Keep your room dark and quiet. Try to go to bed at the same time every night. · Eat a healthy diet. This includes whole grains, dairy, fruits, vegetables, and protein. Eat foods from each of these groups. · Try to lower your stress. Manage your time, build a strong support system, and lead a healthy lifestyle. To lower your stress, try physical activity, slow deep breathing, or getting a massage. · Do not use alcohol, marijuana, or illegal drugs. · Learn the early signs of your mood changes. You can then take steps to help yourself feel better. · Ask for help from friends and family when you need it. You may need help with daily chores when you are depressed. When you are manic, you may need support to control your high energy levels. What should you do if someone in your family has bipolar disorder? · Learn about the disease and signs it's getting worse. · Remind your family member you love them. · Make a plan with all family members about how to take care of your loved one when symptoms are bad. · Remind yourself it will take time for changes to occur. · Try not to blame yourself for the disease. · Know your legal rights and the legal rights of your family member. Support groups or counselors can help with this information. · Take care of yourself. Keep up with your interests, such as career, hobbies, and friends. Use exercise, positive self-talk, deep breathing, and other relaxing exercises to help lower your stress. · Give yourself time to grieve. You may need to deal with emotions such as anger, fear, and frustration. · If you are having a hard time with your feelings or with your relationship with your family member, talk with a counselor. When should you call for help? Call 911 anytime you think you may need emergency care. For example, call if:    · You feel like hurting yourself or someone else.     · Someone who has bipolar disorder displays dangerous behavior, and you think the person might hurt himself or herself or someone else. Call your doctor now or seek immediate medical care if:    · You hear voices.     · Someone you know has bipolar disorder and talks about suicide. Keep the numbers for these national suicide hotlines: 4-535-320-TALK (8-868.398.3524) and 8-299-JZTEALQ (2-685.871.6036). If a suicide threat seems real, with a specific plan and a way to carry it out, stay with the person, or ask someone you trust to stay with the person, until you can get help.     · Someone you know has bipolar disorder and:  ? Starts to give away possessions. ? Is using illegal drugs or drinking alcohol heavily. ? Talks or writes about death, including writing suicide notes or talking about guns, knives, or pills. ? Talks or writes about hurting someone else. ? Starts to spend a lot of time alone. ? Acts very aggressively or suddenly appears calm. ? Talks about beliefs that are not based in reality (delusions). Watch closely for changes in your health, and be sure to contact your doctor if:    · You cannot go to your counseling sessions. Where can you learn more? Go to http://www.jeffries.com/  Enter K052 in the search box to learn more about \"Bipolar Disorder: Care Instructions. \"  Current as of: September 23, 2020               Content Version: 12.8  © 2006-2021 Healthwise, Incorporated. Care instructions adapted under license by [employer] (which disclaims liability or warranty for this information). If you have questions about a medical condition or this instruction, always ask your healthcare professional. [place] [address] any warranty or liability for your use of this information.        Patient Education        Learning About Schizoaffective Disorder  What is schizoaffective disorder? Schizoaffective (say \"gsdt-ux-su-FECK-tiv\") disorder is a complex mental illness. People who have it have the symptoms of both schizophrenia and a mood disorder. The disorder affects how clearly you can think. It can also make it hard to manage your emotions and connect with others. And it affects how happy or sad you feel. What causes it? Experts don't know what causes schizoaffective disorder. It may have different causes for different people. It's not caused by anything you did or how your parents raised you. And it's not a sign of weakness. What are the symptoms? The symptoms of schizoaffective disorder are the same as those of schizophrenia and a mood disorder. People with schizoaffective disorder may have many of these symptoms. Schizophrenia symptoms include:  · Having hallucinations. This means that you see or hear things that aren't really there. · Having delusions. These are beliefs that aren't real.  · Having a hard time feeling and showing emotion. Mood disorder symptoms include:  · Depression. · Feeling extremely happy or having lots of energy. How is it diagnosed? Your doctor or mental health professional usually can tell if you have schizoaffective disorder by talking with you. He or she will look at the order and timing of your symptoms and how long your symptoms last.  Your doctor will ask you about other things too. These may include questions about:  · Any odd experiences you may have had, such as hearing voices or having confusing thoughts. · Your feelings. · Any changes in eating habits, energy level, and interest in daily tasks. · How well you are sleeping. · If you can focus on the things you do. How is it treated? Finding out that you have schizoaffective disorder can be scary and hard to deal with. But the disorder can be treated. The goal of treatment is to lower your stress and help your brain work as it should.  Ongoing treatment can keep the disorder under control. Treatment includes medicines and counseling. Medicines help your symptoms. It's important to take your medicines on schedule to keep your moods even. When you feel good, you may think that you don't need them. But it is important to keep taking them. Counseling helps you change how you think about things. It can also help you cope with the illness. You will work with a mental health professional. This may be a psychologist, a licensed professional counselor, a clinical , or a psychiatrist.  Follow-up care is a key part of your treatment and safety. Be sure to make and go to all appointments, and call your doctor if you are having problems. It's also a good idea to know your test results and keep a list of the medicines you take. Where can you learn more? Go to http://www.gray.com/  Enter A016 in the search box to learn more about \"Learning About Schizoaffective Disorder. \"  Current as of: September 23, 2020               Content Version: 12.8  © 2006-2021 Healthwise, Incorporated. Care instructions adapted under license by [employer] (which disclaims liability or warranty for this information). If you have questions about a medical condition or this instruction, always ask your healthcare professional. [address] any warranty or liability for your use of this information.

## 2021-06-10 NOTE — DISCHARGE SUMMARY
PSYCHIATRIC DISCHARGE SUMMARY         IDENTIFICATION:    Patient Name  Rae Hermosillo   Date of Birth 1975   [facility] [id number]   Medical Record Number  [id number]      Age  41 y.o. PCP None   Admit date:  6/7/2021    Discharge date: 6/10/2021   Room Number  200/12  @ [address]   Date of Service  6/10/2021            TYPE OF DISCHARGE: REGULAR               CONDITION AT DISCHARGE: improved       PROVISIONAL & DISCHARGE DIAGNOSES:    Problem List  Never Reviewed        Codes Class    Cocaine use disorder, mild, abuse ([facility]) ICD-10-CM: F14.10  ICD-9-CM: 305.60         * (Principal) Schizoaffective disorder, bipolar type ([facility] [address] ICD-10-CM: F25.0  ICD-9-CM: 295.70         Tobacco dependence ICD-10-CM: F17.200  ICD-9-CM: 305.1               Active Hospital Problems    *Schizoaffective disorder, bipolar type ([facility] [address]      Cocaine use disorder, mild, abuse ([facility] [address]        DISCHARGE DIAGNOSIS:   Axis I:  SEE ABOVE  Axis II: SEE ABOVE  Axis III: SEE ABOVE  Axis IV:  lack of structure  Axis V:  20 on admission, 55 on discharge     CC & HISTORY OF PRESENT ILLNESS:  \"Psychosis\"    The patient, Rae Hermosillo, is a 41 y.o. BLACK/ male with a past psychiatric history significant for schizoaffective disorder and cocaine and THC use disorders, who presents at this time with complaints of (and/or evidence of) the following emotional symptoms: agitation, delusions and psychotic behavior. Additional symptomatology include Islam preoccupation. The above symptoms have been present for 2+ weeks. These symptoms are of moderate to high severity. These symptoms are constant in nature. The patient's condition has been precipitated by psychosocial stressors. Patient's condition made worse by continued illicit drug use as well as treatment noncompliance.  UDS: +THC, cocaine; BAL=0.     Patient last seen at [facility] a month prior and was given a Prolixin decanoate injection which he did not follow up with - he is now overdue for another administration by 2 weeks. Per PACT team, patient is not compliant with medication and is difficult to find in the community.     The patient is a poor historian. The patient corroborates the above narrative. The patient contracts for safety on the unit and gives consent for the team to contact collateral. The patient is amenable to initiating treatment while on the unit. The patient reports poor compliance to his medications though he states he wanted the long acting Prolixin shot but the PACT team is to blame for him not getting it, repeatedly stating 'they knew where to find me.' Patient is discharge focused, repeatedly states he doesn't need to be in the hospital. Similar to previous admission, the patient repeatedly states he is 'God' and will not answer unless addressed as such.        SOCIAL HISTORY:    Social History     Socioeconomic History    Marital status: SINGLE     Spouse name: Not on file    Number of children: Not on file    Years of education: Not on file    Highest education level: Not on file   Occupational History    Not on file   Tobacco Use    Smoking status: Current Every Day Smoker     Packs/day: 1.00     Years: 10.00     Pack years: 10.00    Smokeless tobacco: Never Used   Substance and Sexual Activity    Alcohol use: No    Drug use: Yes     Types: Marijuana, Heroin    Sexual activity: Not Currently   Other Topics Concern    Not on file   Social History Narrative    40year old AA male admitted for psychosis. Pt has had multiple psychiatric admissions and has a history of non compliance with out patient treatment. Pt abuses THC. He is followed by [facility].      Social Determinants of Health     Financial Resource Strain:     Difficulty of Paying Living Expenses:    Food Insecurity:     Worried About Running Out of Food in the Last Year:     [address] in the Last Year:    Transportation Needs:     Lack of Transportation (Medical):  Lack of Transportation (Non-Medical):    Physical Activity:     Days of Exercise per Week:     Minutes of Exercise per Session:    Stress:     Feeling of Stress :    Social Connections:     Frequency of Communication with Friends and Family:     Frequency of Social Gatherings with Friends and Family:     Attends Confucianism Services:     Active Member of Clubs or Organizations:     Attends Club or Organization Meetings:     Marital Status:    Intimate Partner Violence:     Fear of Current or Ex-Partner:     Emotionally Abused:     Physically Abused:     Sexually Abused:       FAMILY HISTORY:   Family History   Problem Relation Age of Onset    Depression Neg Hx     Suicide Neg Hx     Psychotic Disorder Neg Hx     Substance Abuse Neg Hx     Dementia Neg Hx              HOSPITALIZATION COURSE:    Gem Gambino was admitted to the inpatient psychiatric unit [facility] for acute psychiatric stabilization in regards to symptomatology as described in the HPI above. The differential diagnosis at time of admission included: schizophrenia vs schizoaffective disorder. While on the unit Gem Gambino was involved in individual, group, occupational and milieu therapy. Psychiatric medications were adjusted during this hospitalization including Prolixin, Zyprexa and lithium. Gem Gambino demonstrated a slow, but progressive improvement in overall condition. Much of patient's initial presentation appeared to be related to situational stressors, effects of medication non-compliance, drugs of abuse, and psychological factors. Please see individual progress notes for more specific details regarding patient's hospitalization course.      Patient observed for 24 hours; he remained in fair behavioral control for the duration of the admission; he agreed to getting Prolixin decanoate; and stated that he was always open to getting the medication and that the PACT team couldn't find him. Patient discharge focused, irritable but calm, getting no PRNs for agitation. At time of discharge, Sindy Felder is without significant problems of depression, psychosis, or hill. Patient free of suicidal and homicidal ideations (appears to be at very low risk of suicide or homicide) and reports many positive predictive factors in terms of not attempting suicide or homicide. Overall presentation at time of discharge is most consistent with the diagnosis of schizoaffective disorder and cocaine use disorder. Patient has maximized benefit to be derived from acute inpatient psychiatric treatment. All members of the treatment team concur with each other in regards to plans for discharge today. Patient and PACT team aware and in agreement with discharge and discharge plan. LABS AND IMAGAING:    Labs Reviewed   CBC WITH AUTOMATED DIFF - Abnormal; Notable for the following components:       Result Value    WBC 3.1 (*)     EOSINOPHILS 11 (*)     BASOPHILS 2 (*)     ABS.  NEUTROPHILS 1.1 (*)     All other components within normal limits   METABOLIC PANEL, COMPREHENSIVE - Abnormal; Notable for the following components:    BUN/Creatinine ratio 6 (*)     A-G Ratio 1.0 (*)     All other components within normal limits   URINALYSIS W/ REFLEX CULTURE - Abnormal; Notable for the following components:    Appearance CLOUDY (*)     Urobilinogen 4.0 (*)     Amorphous Crystals 3+ (*)     CA Oxalate crystals FEW (*)     All other components within normal limits   DRUG SCREEN, URINE - Abnormal; Notable for the following components:    COCAINE Positive (*)     THC (TH-CANNABINOL) Positive (*)     All other components within normal limits   ETHYL ALCOHOL   COVID-19 WITH INFLUENZA A/B     Lab Results   Component Value Date/Time    Valproic acid 68 05/16/2018 10:31 AM     Admission on 06/07/2021   Component Date Value Ref Range Status    WBC 06/07/2021 3.1* 4.1 - 11.1 K/uL Final    RBC 06/07/2021 4.92  4.10 - 5.70 M/uL Final    HGB 06/07/2021 14.4  12.1 - 17.0 g/dL Final    HCT 06/07/2021 43.8  36.6 - 50.3 % Final    MCV 06/07/2021 89.0  80.0 - 99.0 FL Final    MCH 06/07/2021 29.3  26.0 - 34.0 PG Final    MCHC 06/07/2021 32.9  30.0 - 36.5 g/dL Final    RDW 06/07/2021 13.2  11.5 - 14.5 % Final    PLATELET 31/89/3090 734  150 - 400 K/uL Final    MPV 06/07/2021 9.0  8.9 - 12.9 FL Final    NRBC 06/07/2021 0.0  0  WBC Final    ABSOLUTE NRBC 06/07/2021 0.00  0.00 - 0.01 K/uL Final    NEUTROPHILS 06/07/2021 34  32 - 75 % Final    LYMPHOCYTES 06/07/2021 40  12 - 49 % Final    MONOCYTES 06/07/2021 13  5 - 13 % Final    EOSINOPHILS 06/07/2021 11* 0 - 7 % Final    BASOPHILS 06/07/2021 2* 0 - 1 % Final    IMMATURE GRANULOCYTES 06/07/2021 0  0.0 - 0.5 % Final    ABS. NEUTROPHILS 06/07/2021 1.1* 1.8 - 8.0 K/UL Final    ABS. LYMPHOCYTES 06/07/2021 1.3  0.8 - 3.5 K/UL Final    ABS. MONOCYTES 06/07/2021 0.4  0.0 - 1.0 K/UL Final    ABS. EOSINOPHILS 06/07/2021 0.3  0.0 - 0.4 K/UL Final    ABS. BASOPHILS 06/07/2021 0.1  0.0 - 0.1 K/UL Final    ABS. IMM. GRANS. 06/07/2021 0.0  0.00 - 0.04 K/UL Final    DF 06/07/2021 AUTOMATED    Final    Sodium 06/07/2021 144  136 - 145 mmol/L Final    Potassium 06/07/2021 3.9  3.5 - 5.1 mmol/L Final    Chloride 06/07/2021 107  97 - 108 mmol/L Final    CO2 06/07/2021 29  21 - 32 mmol/L Final    Anion gap 06/07/2021 8  5 - 15 mmol/L Final    Glucose 06/07/2021 87  65 - 100 mg/dL Final    BUN 06/07/2021 6  6 - 20 MG/DL Final    Creatinine 06/07/2021 1.04  0.70 - 1.30 MG/DL Final    BUN/Creatinine ratio 06/07/2021 6* 12 - 20   Final    GFR est AA 06/07/2021 >60  >60 ml/min/1.73m2 Final    GFR est non-AA 06/07/2021 >60  >60 ml/min/1.73m2 Final    Calcium 06/07/2021 9.0  8.5 - 10.1 MG/DL Final    Bilirubin, total 06/07/2021 1.0  0.2 - 1.0 MG/DL Final    ALT (SGPT) 06/07/2021 22  12 - 78 U/L Final    AST (SGOT) 06/07/2021 20  15 - 37 U/L Final    Alk.  phosphatase 06/07/2021 92 45 - 117 U/L Final    Protein, total 06/07/2021 7.6  6.4 - 8.2 g/dL Final    Albumin 06/07/2021 3.8  3.5 - 5.0 g/dL Final    Globulin 06/07/2021 3.8  2.0 - 4.0 g/dL Final    A-G Ratio 06/07/2021 1.0* 1.1 - 2.2   Final    ALCOHOL(ETHYL),SERUM 06/07/2021 <10  <10 MG/DL Final    Color 06/07/2021 YELLOW/STRAW    Final    Appearance 06/07/2021 CLOUDY* CLEAR   Final    Specific gravity 06/07/2021 1.025  1.003 - 1.030   Final    pH (UA) 06/07/2021 7.0  5.0 - 8.0   Final    Protein 06/07/2021 Negative  NEG mg/dL Final    Glucose 06/07/2021 Negative  NEG mg/dL Final    Ketone 06/07/2021 Negative  NEG mg/dL Final    Bilirubin 06/07/2021 Negative  NEG   Final    Blood 06/07/2021 Negative  NEG   Final    Urobilinogen 06/07/2021 4.0* 0.2 - 1.0 EU/dL Final    Nitrites 06/07/2021 Negative  NEG   Final    Leukocyte Esterase 06/07/2021 Negative  NEG   Final    WBC 06/07/2021 0-4  0 - 4 /hpf Final    RBC 06/07/2021 0-5  0 - 5 /hpf Final    Epithelial cells 06/07/2021 FEW  FEW /lpf Final    Bacteria 06/07/2021 Negative  NEG /hpf Final    UA:UC IF INDICATED 06/07/2021 CULTURE NOT INDICATED BY UA RESULT  CNI   Final    Amorphous Crystals 06/07/2021 3+* NEG Final    CA Oxalate crystals 06/07/2021 FEW* NEG   Final    AMPHETAMINES 06/07/2021 Negative  NEG   Final    BARBITURATES 06/07/2021 Negative  NEG   Final    BENZODIAZEPINES 06/07/2021 Negative  NEG   Final    COCAINE 06/07/2021 Positive* NEG   Final    METHADONE 06/07/2021 Negative  NEG   Final    OPIATES 06/07/2021 Negative  NEG   Final    PCP(PHENCYCLIDINE) 06/07/2021 Negative  NEG   Final    THC (TH-CANNABINOL) 06/07/2021 Positive* NEG   Final    Drug screen comment 06/07/2021 (NOTE)   Final    SARS-CoV-2 06/07/2021 Not detected  NOTD   Final    Influenza A by PCR 06/07/2021 Not detected  NOTD   Final    Influenza B by PCR 06/07/2021 Not detected  NOTD   Final    Ventricular Rate 06/08/2021 61  BPM Final    Atrial Rate 06/08/2021 61  BPM Final  P-R Interval 06/08/2021 170  ms Final    QRS Duration 06/08/2021 90  ms Final    Q-T Interval 06/08/2021 392  ms Final    QTC Calculation (Bezet) 06/08/2021 394  ms Final    Calculated P Axis 06/08/2021 71  degrees Final    Calculated R Axis 06/08/2021 84  degrees Final    Calculated T Axis 06/08/2021 32  degrees Final    Diagnosis 06/08/2021    Final                    Value:** Poor data quality, interpretation may be adversely affected  Normal sinus rhythm  Normal ECG  When compared with ECG of 11-MAY-2021 10:14,  No significant change was found  Confirmed by Emmanuel Fang M.D., Saleem Moura ([id number]) on 6/8/2021 8:26:31 AM       No results found. DISPOSITION:    Home. Patient to f/u with psychiatric appointments. Patient is to f/u with internist as directed. Patient should have a lithium level and associated labs checked within the next 3 months by patient's o/p psychiatrist/internist.               FOLLOW-UP CARE:    Activity as tolerated  Regular diet  Wound Care: none needed. Follow-up Information     Follow up With Specialties Details Why [address] [facility] PACT   You met with the PACT supervisor Sabina today and will meet with Dr. Jin Mckee and team tomorrow. [address] [place] [address]   Address: [address] [place] [address], [address] [place] [address]  Phone: [phone]  Fax: [phone]  Crisis number:  [phone]                    PROGNOSIS:   Guarded ---- based on nature of patient's pathology/ies and treatment compliance issues. Prognosis is greatly dependent upon patient's ability to remain sober and to follow up with scheduled appointments as well as to comply with psychiatric medications as prescribed.             DISCHARGE MEDICATIONS:     Informed consent given for the use of following psychotropic medications:  Current Discharge Medication List      CONTINUE these medications which have CHANGED    Details   fluPHENAZine decanoate (PROLIXIN) 25 mg/mL injection 1 mL by IntraMUSCular route Once every 2 weeks. Next dose due on 6/23/21  Indications: schizophrenia  Qty: 1 Vial, Refills: 0  Start date: 6/23/2021         CONTINUE these medications which have NOT CHANGED    Details   !! lithium carbonate SR (LITHOBID) 300 mg CR tablet Take 600 mg by mouth daily. !! lithium carbonate CR (ESKALITH CR) 450 mg CR tablet Take 900 mg by mouth nightly. fluPHENAZine (PROLIXIN) 5 mg tablet Take 5 mg by mouth daily. Indications: schizophrenia      diphenhydrAMINE (BENADRYL) 25 mg capsule Take 50 mg by mouth nightly. Indications: extrapyramidal symptoms as a result of taking the medication      OLANZapine (ZyPREXA zydis) 10 mg disintegrating tablet Take 10 mg by mouth nightly. !! - Potential duplicate medications found. Please discuss with provider. A coordinated, multidisplinary treatment team round was conducted with Tessa Jean---this is done daily here at [facility]. This team consists of the nurse, psychiatric unit pharmacist,  and Cinthia Magana. I have spent greater than 35 minutes on discharge work.     Signed:  Yoav Farrell MD  6/10/2021

## 2021-06-10 NOTE — PROGRESS NOTES
Assumed care of patient after receiving shift report from outgoing nurse. Patient was withdrawn in bed. Pt does not make eye contact. Pt refused vitals. Pt appears uninterested. Pt told Rn to \"just write none\" when asking his . Affect was flat, mood was irritable. Hygiene is poor, independent in ADLs. Sleep is \"here and there\". Appetite patterns WNL. Denies AVH/SI/HI. Endorses Anxiety/Depression from \"being in here\". Last BM was 21. Pt denies pain. Pt encouraged to continue to participate in care. : Pt accepted scheduled medications in bed. Pt received PRN Trazodone for sleep aid. : Pt appears asleep. PRN medication appear effective. NAD noted    Pt has been observed throughout the night. Total hours slept approximately 10.25. No s/s of distress noted. Patient has remained safe.

## 2021-06-10 NOTE — PROGRESS NOTES
Discharge Note:    Pt is A&OX4. Pt denies SI/HI, and A/VH. Vitals WNL. Anxiety=7, Depression=8. Pt states his anxiety/depression levels are high because he is in the this hospital.  Last BM yesterday, 6/9. Pt is medication/diet compliant. Pt is mostly isolative to his room, eats in his room, and comes out to use the telephone. Pt gets along well w/peers. No issues observed or reported. Pt discharged today @ 1540. Pt reviewed, understood, and signed all discharge paperwork. Paper signed belongings/property worksheet.

## 2021-06-10 NOTE — BH NOTES
GROUP THERAPY PROGRESS NOTE    Patient did not participate in Coping Skills group.      Scot Miles LPC [facility]

## 2021-06-10 NOTE — BH NOTES
GROUP THERAPY PROGRESS NOTE    Patient did not participate in Process group.      Kade Pink LPC [facility]

## 2021-07-08 ENCOUNTER — APPOINTMENT (OUTPATIENT)
Dept: CT IMAGING | Age: 46
End: 2021-07-08
Attending: PHYSICIAN ASSISTANT
Payer: MEDICAID

## 2021-07-08 ENCOUNTER — HOSPITAL ENCOUNTER (EMERGENCY)
Age: 46
Discharge: HOME | End: 2021-07-08
Attending: EMERGENCY MEDICINE
Payer: MEDICAID

## 2021-07-08 VITALS
HEART RATE: 61 BPM | DIASTOLIC BLOOD PRESSURE: 73 MMHG | HEIGHT: 74 IN | BODY MASS INDEX: 25.67 KG/M2 | RESPIRATION RATE: 19 BRPM | SYSTOLIC BLOOD PRESSURE: 114 MMHG | OXYGEN SATURATION: 96 % | WEIGHT: 200 LBS | TEMPERATURE: 98 F

## 2021-07-08 DIAGNOSIS — Z00.8 MEDICAL CLEARANCE FOR INCARCERATION: Primary | ICD-10-CM

## 2021-07-08 LAB
ALBUMIN SERPL-MCNC: 3.8 G/DL (ref 3.5–5)
ALBUMIN/GLOB SERPL: 1.1 {RATIO} (ref 1.1–2.2)
ALP SERPL-CCNC: 81 U/L (ref 45–117)
ALT SERPL-CCNC: 19 U/L (ref 12–78)
ANION GAP SERPL CALC-SCNC: 11 MMOL/L (ref 5–15)
AST SERPL-CCNC: 18 U/L (ref 15–37)
BASOPHILS # BLD: 0.1 K/UL (ref 0–0.1)
BASOPHILS NFR BLD: 1 % (ref 0–1)
BILIRUB SERPL-MCNC: 0.5 MG/DL (ref 0.2–1)
BUN SERPL-MCNC: 6 MG/DL (ref 6–20)
BUN/CREAT SERPL: 6 (ref 12–20)
CALCIUM SERPL-MCNC: 9 MG/DL (ref 8.5–10.1)
CHLORIDE SERPL-SCNC: 106 MMOL/L (ref 97–108)
CO2 SERPL-SCNC: 25 MMOL/L (ref 21–32)
CREAT SERPL-MCNC: 1 MG/DL (ref 0.7–1.3)
DIFFERENTIAL METHOD BLD: ABNORMAL
EOSINOPHIL # BLD: 0.6 K/UL (ref 0–0.4)
EOSINOPHIL NFR BLD: 9 % (ref 0–7)
ERYTHROCYTE [DISTWIDTH] IN BLOOD BY AUTOMATED COUNT: 13.4 % (ref 11.5–14.5)
ETHANOL SERPL-MCNC: <10 MG/DL
FLUAV RNA SPEC QL NAA+PROBE: NOT DETECTED
FLUBV RNA SPEC QL NAA+PROBE: NOT DETECTED
GLOBULIN SER CALC-MCNC: 3.6 G/DL (ref 2–4)
GLUCOSE SERPL-MCNC: 85 MG/DL (ref 65–100)
HCT VFR BLD AUTO: 39.3 % (ref 36.6–50.3)
HGB BLD-MCNC: 13 G/DL (ref 12.1–17)
IMM GRANULOCYTES # BLD AUTO: 0 K/UL (ref 0–0.04)
IMM GRANULOCYTES NFR BLD AUTO: 0 % (ref 0–0.5)
LYMPHOCYTES # BLD: 1.7 K/UL (ref 0.8–3.5)
LYMPHOCYTES NFR BLD: 25 % (ref 12–49)
MCH RBC QN AUTO: 29.1 PG (ref 26–34)
MCHC RBC AUTO-ENTMCNC: 33.1 G/DL (ref 30–36.5)
MCV RBC AUTO: 87.9 FL (ref 80–99)
MONOCYTES # BLD: 0.5 K/UL (ref 0–1)
MONOCYTES NFR BLD: 8 % (ref 5–13)
NEUTS SEG # BLD: 3.8 K/UL (ref 1.8–8)
NEUTS SEG NFR BLD: 57 % (ref 32–75)
NRBC # BLD: 0 K/UL (ref 0–0.01)
NRBC BLD-RTO: 0 PER 100 WBC
PLATELET # BLD AUTO: 229 K/UL (ref 150–400)
PMV BLD AUTO: 9.1 FL (ref 8.9–12.9)
POTASSIUM SERPL-SCNC: 3.6 MMOL/L (ref 3.5–5.1)
PROT SERPL-MCNC: 7.4 G/DL (ref 6.4–8.2)
RBC # BLD AUTO: 4.47 M/UL (ref 4.1–5.7)
SARS-COV-2, COV2: NOT DETECTED
SODIUM SERPL-SCNC: 142 MMOL/L (ref 136–145)
WBC # BLD AUTO: 6.8 K/UL (ref 4.1–11.1)

## 2021-07-08 PROCEDURE — 99283 EMERGENCY DEPT VISIT LOW MDM: CPT

## 2021-07-08 PROCEDURE — 87636 SARSCOV2 & INF A&B AMP PRB: CPT

## 2021-07-08 PROCEDURE — 70450 CT HEAD/BRAIN W/O DYE: CPT

## 2021-07-08 PROCEDURE — 85025 COMPLETE CBC W/AUTO DIFF WBC: CPT

## 2021-07-08 PROCEDURE — 80053 COMPREHEN METABOLIC PANEL: CPT

## 2021-07-08 PROCEDURE — 36415 COLL VENOUS BLD VENIPUNCTURE: CPT

## 2021-07-08 PROCEDURE — 82077 ASSAY SPEC XCP UR&BREATH IA: CPT

## 2021-07-08 NOTE — ED NOTES
Emergency Department Nursing Plan of Care       The Nursing Plan of Care is developed from the Nursing assessment and Emergency Department Attending provider initial evaluation. The plan of care may be reviewed in the ED Provider note.     The Plan of Care was developed with the following considerations:   Patient / Family readiness to learn indicated by:verbalized understanding  Persons(s) to be included in education: patient  Barriers to Learning/Limitations:No    Signed     Ulises Us RN    7/8/2021   12:49 PM

## 2021-07-08 NOTE — ED NOTES
[employer] advised patient was released on a summons, that he was free to go when he was medically cleared. Pt was provided dinner.  Pt still could not provide urine sample

## 2021-07-08 NOTE — ED NOTES
Pt asleep in bed.  Talked with [id number] DAVID Strickland from [facility] who was waiting to hear back from the pac team before consult
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
## 2021-07-08 NOTE — BSMART NOTE
BSMART Note    Consult was placed for patient who was refused by Yaakov Alvarez. He was arrested for disorderly conduct today at a gas station. When he was taken to longterm he refused to speak to the nurse or give vitals and she had him sent to the hospital.  It was reported that patient needs a mental health evaluation. Because patient is in police custody he is not voluntary and patient was not seen by this clinician. Patient is a [facility] PACT team client, and this clinician called the PACT team and spoke to his  Gilles. Collaborated with Adeola Ash, [address] [place] [address] clinician, who contacted longterm staff about the situation. Patient will be discharged back to the longterm. No mental health evaluation has been deemed necessary by longterm staff.     Shy Colbert MA

## 2021-07-08 NOTE — ED TRIAGE NOTES
Pt is here to be medically cleared, per officer, pt refused to answer question in nursing home so they sent him here. Pt is also refusing to answer this RN questions. Pt is in handcuffs and [employer] officer is at bedside.

## 2021-07-08 NOTE — ED NOTES
Hourly rounding completed on pt. Offered assistance for toileting or hygiene. Pt is up-to-date on plan of care. No pain interventions required at this time. Warm blanket offered, call bell within reach, safety precautions in place, bed locked and in the lowest position.

## 2021-12-20 ENCOUNTER — OFFICE VISIT (OUTPATIENT)
Dept: INFECTIOUS DISEASES | Age: 46
End: 2021-12-20
Payer: COMMERCIAL

## 2021-12-20 VITALS
DIASTOLIC BLOOD PRESSURE: 80 MMHG | HEART RATE: 91 BPM | RESPIRATION RATE: 14 BRPM | WEIGHT: 200 LBS | BODY MASS INDEX: 25.67 KG/M2 | HEIGHT: 74 IN | SYSTOLIC BLOOD PRESSURE: 127 MMHG | OXYGEN SATURATION: 97 %

## 2021-12-20 DIAGNOSIS — A64 STD (SEXUALLY TRANSMITTED DISEASE): Primary | ICD-10-CM

## 2021-12-20 PROCEDURE — 99204 OFFICE O/P NEW MOD 45 MIN: CPT | Performed by: INTERNAL MEDICINE

## 2021-12-20 RX ORDER — TRAZODONE HYDROCHLORIDE 50 MG/1
50 TABLET ORAL
COMMUNITY

## 2021-12-20 RX ORDER — LANOLIN ALCOHOL/MO/W.PET/CERES
100 CREAM (GRAM) TOPICAL DAILY
COMMUNITY

## 2021-12-20 RX ORDER — HALOPERIDOL 5 MG/1
5 TABLET ORAL
COMMUNITY

## 2021-12-20 RX ORDER — LITHIUM CARBONATE 300 MG
300 TABLET ORAL 2 TIMES DAILY
COMMUNITY

## 2021-12-20 RX ORDER — ACETAMINOPHEN 500 MG
2 TABLET ORAL AS NEEDED
COMMUNITY

## 2021-12-20 RX ORDER — DIPHENHYDRAMINE HYDROCHLORIDE 50 MG/ML
50 INJECTION, SOLUTION INTRAMUSCULAR; INTRAVENOUS
COMMUNITY

## 2021-12-20 RX ORDER — DEXTROMETHORPHAN HYDROBROMIDE, GUAIFENESIN 5; 100 MG/5ML; MG/5ML
650 LIQUID ORAL
COMMUNITY

## 2021-12-20 RX ORDER — CHOLECALCIFEROL (VITAMIN D3) 125 MCG
1 CAPSULE ORAL DAILY
COMMUNITY

## 2021-12-20 RX ORDER — RISPERIDONE 2 MG/1
2 TABLET, FILM COATED ORAL
COMMUNITY

## 2021-12-20 RX ORDER — HALOPERIDOL 5 MG/ML
5 INJECTION INTRAMUSCULAR AS NEEDED
COMMUNITY

## 2021-12-20 RX ORDER — OLANZAPINE 20 MG/1
20 TABLET ORAL
COMMUNITY

## 2021-12-20 RX ORDER — PENICILLIN G BENZATHINE 2400000 [IU]/4ML
2400000 INJECTION, SUSPENSION INTRAMUSCULAR ONCE
COMMUNITY

## 2021-12-20 RX ORDER — ERGOCALCIFEROL 1.25 MG/1
50000 CAPSULE ORAL DAILY
COMMUNITY
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
## 2021-12-20 NOTE — PROGRESS NOTES
Chief Complaint   Patient presents with   Karl Self Establish Care     Visit Vitals  /80 (BP 1 Location: Right upper arm, BP Patient Position: Sitting, BP Cuff Size: Adult)   Pulse 91   Resp 14   Ht 6' 2\" (1.88 m)   Wt 200 lb (90.7 kg)   SpO2 97%   BMI 25.68 kg/m²     1. Have you been to the ER, urgent care clinic since your last visit? Hospitalized since your last visit? No    2. Have you seen or consulted any other health care providers outside of the [address] [place] [address] since your last visit? Include any pap smears or colon screening.  No

## 2022-03-18 PROBLEM — F14.10 COCAINE USE DISORDER, MILD, ABUSE (HCC): Status: ACTIVE | Noted: 2021-05-07

## 2022-03-19 PROBLEM — F25.0 SCHIZOAFFECTIVE DISORDER, BIPOLAR TYPE (HCC): Status: ACTIVE | Noted: 2021-05-07

## 2023-04-06 NOTE — BH NOTES
No need for earlier appointment unless has new symptoms.  Otherwise can continue to follow annually   Pt refuses Chlamydia/GC PCR and HIV lab work. Pt refuses breakfast. States he must wait to meet with the doctor before eating. Pt focused on \"Depakote Lab work\". Pt medication compliant. Pt allows wound care nurse to assess abdominal/hip area. Pt allows Mepilex dressing to right hip. See wound care note. Pt is paranoid suspicious. Pt refuses lunch; insisting pt must wait to talk to the doctor. Education provided. Pt continues to refuse food.     1420- pt met with treatment team.

## 2023-05-12 RX ORDER — SENNOSIDES 8.6 MG
CAPSULE ORAL EVERY 6 HOURS PRN
COMMUNITY

## 2023-05-12 RX ORDER — OLANZAPINE 20 MG/1
20 TABLET ORAL NIGHTLY
COMMUNITY

## 2023-05-12 RX ORDER — TRAZODONE HYDROCHLORIDE 50 MG/1
50 TABLET ORAL NIGHTLY
COMMUNITY

## 2023-05-12 RX ORDER — RISPERIDONE 2 MG/1
TABLET ORAL
COMMUNITY

## 2023-05-12 RX ORDER — LITHIUM CARBONATE 300 MG
TABLET ORAL 2 TIMES DAILY
COMMUNITY

## 2023-05-12 RX ORDER — HALOPERIDOL 5 MG/1
TABLET ORAL EVERY 6 HOURS PRN
COMMUNITY

## 2023-05-12 RX ORDER — LANOLIN ALCOHOL/MO/W.PET/CERES
100 CREAM (GRAM) TOPICAL DAILY
COMMUNITY

## 2023-05-12 RX ORDER — ERGOCALCIFEROL 1.25 MG/1
CAPSULE ORAL DAILY
COMMUNITY